# Patient Record
Sex: FEMALE | Race: WHITE | NOT HISPANIC OR LATINO | Employment: FULL TIME | URBAN - METROPOLITAN AREA
[De-identification: names, ages, dates, MRNs, and addresses within clinical notes are randomized per-mention and may not be internally consistent; named-entity substitution may affect disease eponyms.]

---

## 2020-01-01 ENCOUNTER — EXTERNAL RECORD (OUTPATIENT)
Dept: OTHER | Age: 25
End: 2020-01-01

## 2020-07-10 ENCOUNTER — EMPLOYEE HEALTH (OUTPATIENT)
Dept: OTHER | Age: 25
End: 2020-07-10

## 2020-07-10 DIAGNOSIS — Z11.59 SCREENING FOR VIRAL DISEASE: Primary | ICD-10-CM

## 2020-08-05 ENCOUNTER — EMPLOYEE HEALTH (OUTPATIENT)
Dept: OTHER | Age: 25
End: 2020-08-05

## 2020-08-06 ENCOUNTER — EMPLOYEE HEALTH (OUTPATIENT)
Dept: OTHER | Age: 25
End: 2020-08-06

## 2020-11-07 LAB
SARS-COV-2 RNA SPEC QL NAA+PROBE: POSITIVE
SPECIMEN SOURCE: ABNORMAL

## 2020-11-09 ENCOUNTER — EMPLOYEE HEALTH (OUTPATIENT)
Dept: OTHER | Age: 25
End: 2020-11-09

## 2020-11-10 ENCOUNTER — EMPLOYEE HEALTH (OUTPATIENT)
Dept: OTHER | Age: 25
End: 2020-11-10

## 2020-11-10 DIAGNOSIS — Z20.822 SUSPECTED COVID-19 VIRUS INFECTION: Primary | ICD-10-CM

## 2020-11-17 ENCOUNTER — EMPLOYEE HEALTH (OUTPATIENT)
Dept: OTHER | Age: 25
End: 2020-11-17

## 2020-11-19 ENCOUNTER — EMPLOYEE HEALTH (OUTPATIENT)
Dept: OTHER | Age: 25
End: 2020-11-19

## 2021-01-21 ENCOUNTER — IMMUNIZATION (OUTPATIENT)
Dept: LAB | Age: 26
End: 2021-01-21

## 2021-01-21 DIAGNOSIS — Z23 NEED FOR VACCINATION: Primary | ICD-10-CM

## 2021-01-21 PROCEDURE — 91300 COVID 19 PFIZER-BIONTECH: CPT

## 2021-01-21 PROCEDURE — 0001A COVID 19 PFIZER-BIONTECH: CPT

## 2021-02-15 ENCOUNTER — IMMUNIZATION (OUTPATIENT)
Dept: LAB | Age: 26
End: 2021-02-15
Attending: HOSPITALIST

## 2021-02-15 DIAGNOSIS — Z23 NEED FOR VACCINATION: Primary | ICD-10-CM

## 2021-02-15 PROCEDURE — 91300 COVID 19 PFIZER-BIONTECH: CPT

## 2021-02-15 PROCEDURE — 0002A COVID 19 PFIZER-BIONTECH: CPT

## 2021-04-05 LAB — CYTOLOGY CVX/VAG DOC THIN PREP: NORMAL

## 2021-07-29 ENCOUNTER — EMPLOYEE HEALTH (OUTPATIENT)
Dept: OTHER | Age: 26
End: 2021-07-29

## 2021-07-29 DIAGNOSIS — Z11.59 SCREENING FOR VIRAL DISEASE: Primary | ICD-10-CM

## 2021-08-07 ENCOUNTER — EMPLOYEE HEALTH (OUTPATIENT)
Dept: OTHER | Age: 26
End: 2021-08-07

## 2021-08-07 DIAGNOSIS — Z11.59 SCREENING FOR VIRAL DISEASE: Primary | ICD-10-CM

## 2021-08-07 LAB — SARS-COV-2 AG RESP QL IA.RAPID: NEGATIVE

## 2021-08-11 ENCOUNTER — EMPLOYEE HEALTH (OUTPATIENT)
Dept: OTHER | Age: 26
End: 2021-08-11

## 2021-09-13 ENCOUNTER — EMPLOYEE HEALTH (OUTPATIENT)
Dept: OTHER | Facility: HOSPITAL | Age: 26
End: 2021-09-13
Attending: PREVENTIVE MEDICINE

## 2021-09-13 DIAGNOSIS — Z11.1 SCREENING-PULMONARY TB: Primary | ICD-10-CM

## 2021-09-13 PROCEDURE — 86480 TB TEST CELL IMMUN MEASURE: CPT

## 2021-09-15 LAB
M TB IFN-G CD4+ T-CELLS BLD-ACNC: 0.03 IU/ML
M TB TUBERC IFN-G BLD QL: NEGATIVE
M TB TUBERC IGNF/MITOGEN IGNF CONTROL: >10 IU/ML
QFT TB1 AG MINUS NIL: -0.01 IU/ML
QFT TB2 AG MINUS NIL: 0.02 IU/ML

## 2021-12-23 ENCOUNTER — HOSPITAL ENCOUNTER (EMERGENCY)
Facility: HOSPITAL | Age: 26
Discharge: HOME | End: 2021-12-23

## 2021-12-28 ENCOUNTER — TELEPHONE (OUTPATIENT)
Dept: INTERNAL MEDICINE CLINIC | Facility: HOSPITAL | Age: 26
End: 2021-12-28

## 2022-01-07 ENCOUNTER — LAB ENCOUNTER (OUTPATIENT)
Dept: LAB | Facility: HOSPITAL | Age: 27
End: 2022-01-07
Attending: PREVENTIVE MEDICINE

## 2022-01-07 DIAGNOSIS — Z11.1 SCREENING FOR TUBERCULOSIS: ICD-10-CM

## 2022-01-07 PROCEDURE — 86480 TB TEST CELL IMMUN MEASURE: CPT

## 2022-01-07 PROCEDURE — 36415 COLL VENOUS BLD VENIPUNCTURE: CPT

## 2022-01-10 LAB
M TB IFN-G CD4+ T-CELLS BLD-ACNC: 0 IU/ML
M TB TUBERC IFN-G BLD QL: NEGATIVE
M TB TUBERC IGNF/MITOGEN IGNF CONTROL: >10 IU/ML
QFT TB1 AG MINUS NIL: 0 IU/ML
QFT TB2 AG MINUS NIL: 0 IU/ML

## 2022-04-13 ENCOUNTER — LAB ENCOUNTER (OUTPATIENT)
Dept: LAB | Age: 27
End: 2022-04-13
Attending: PREVENTIVE MEDICINE

## 2022-10-19 ENCOUNTER — LAB REQUISITION (OUTPATIENT)
Dept: LAB | Age: 27
End: 2022-10-19

## 2022-10-19 DIAGNOSIS — Z02.1 ENCOUNTER FOR PRE-EMPLOYMENT EXAMINATION: ICD-10-CM

## 2022-10-19 PROCEDURE — PSEU9049 QUANTIFERON TB PLUS: Performed by: CLINICAL MEDICAL LABORATORY

## 2022-10-19 PROCEDURE — 86480 TB TEST CELL IMMUN MEASURE: CPT | Performed by: CLINICAL MEDICAL LABORATORY

## 2022-10-20 LAB
GAMMA INTERFERON BACKGROUND BLD IA-ACNC: 0.05 IU/ML
M TB IFN-G BLD-IMP: NEGATIVE
M TB IFN-G CD4+ BCKGRND COR BLD-ACNC: 0 IU/ML
M TB IFN-G CD4+CD8+ BCKGRND COR BLD-ACNC: 0 IU/ML
MITOGEN IGNF BCKGRD COR BLD-ACNC: >10 IU/ML

## 2023-03-08 ENCOUNTER — HOSPITAL ENCOUNTER (EMERGENCY)
Facility: HOSPITAL | Age: 28
Discharge: HOME | End: 2023-03-08
Attending: EMERGENCY MEDICINE
Payer: COMMERCIAL

## 2023-03-08 VITALS
RESPIRATION RATE: 18 BRPM | TEMPERATURE: 97 F | WEIGHT: 150 LBS | DIASTOLIC BLOOD PRESSURE: 82 MMHG | SYSTOLIC BLOOD PRESSURE: 133 MMHG | HEART RATE: 76 BPM | HEIGHT: 65 IN | OXYGEN SATURATION: 100 % | BODY MASS INDEX: 24.99 KG/M2

## 2023-03-08 DIAGNOSIS — R31.9 URINARY TRACT INFECTION WITH HEMATURIA, SITE UNSPECIFIED: Primary | ICD-10-CM

## 2023-03-08 DIAGNOSIS — N39.0 URINARY TRACT INFECTION WITH HEMATURIA, SITE UNSPECIFIED: Primary | ICD-10-CM

## 2023-03-08 LAB
BILIRUB UR QL STRIP.AUTO: NEGATIVE
COLOR UR AUTO: YELLOW
GLUCOSE UR STRIP.AUTO-MCNC: NEGATIVE MG/DL
KETONES UR STRIP.AUTO-MCNC: NEGATIVE MG/DL
NITRITE UR QL STRIP.AUTO: NEGATIVE
PH UR STRIP.AUTO: 6 [PH] (ref 5–8)
PROT UR STRIP.AUTO-MCNC: 30 MG/DL
RBC #/AREA URNS AUTO: >10 /HPF
SP GR UR STRIP.AUTO: 1.01 (ref 1–1.03)
UROBILINOGEN UR STRIP.AUTO-MCNC: <2 MG/DL

## 2023-03-08 PROCEDURE — 81001 URINALYSIS AUTO W/SCOPE: CPT | Performed by: EMERGENCY MEDICINE

## 2023-03-08 PROCEDURE — 87086 URINE CULTURE/COLONY COUNT: CPT | Performed by: EMERGENCY MEDICINE

## 2023-03-08 PROCEDURE — 87186 SC STD MICRODIL/AGAR DIL: CPT | Performed by: EMERGENCY MEDICINE

## 2023-03-08 PROCEDURE — 87077 CULTURE AEROBIC IDENTIFY: CPT | Performed by: EMERGENCY MEDICINE

## 2023-03-08 PROCEDURE — 99283 EMERGENCY DEPT VISIT LOW MDM: CPT

## 2023-03-08 RX ORDER — OMEPRAZOLE 20 MG/1
20 CAPSULE, DELAYED RELEASE ORAL
COMMUNITY

## 2023-03-08 RX ORDER — FLUOXETINE 20 MG/1
20 TABLET, FILM COATED ORAL DAILY
COMMUNITY

## 2023-03-08 RX ORDER — SULFAMETHOXAZOLE AND TRIMETHOPRIM 800; 160 MG/1; MG/1
1 TABLET ORAL 2 TIMES DAILY
Qty: 6 TABLET | Refills: 0 | Status: SHIPPED | OUTPATIENT
Start: 2023-03-08 | End: 2023-03-11

## 2023-05-29 PROBLEM — K29.50 CHRONIC GASTRITIS: Status: ACTIVE | Noted: 2021-03-24

## 2023-05-29 PROBLEM — L70.0 ACNE VULGARIS: Status: ACTIVE | Noted: 2018-04-26

## 2023-05-29 PROBLEM — L30.9 ECZEMA: Status: ACTIVE | Noted: 2020-01-29

## 2023-05-30 ENCOUNTER — OFFICE VISIT (OUTPATIENT)
Dept: INTERNAL MEDICINE CLINIC | Facility: CLINIC | Age: 28
End: 2023-05-30
Payer: COMMERCIAL

## 2023-05-30 VITALS
SYSTOLIC BLOOD PRESSURE: 118 MMHG | BODY MASS INDEX: 24.66 KG/M2 | DIASTOLIC BLOOD PRESSURE: 70 MMHG | OXYGEN SATURATION: 98 % | HEART RATE: 78 BPM | RESPIRATION RATE: 16 BRPM | HEIGHT: 65 IN | WEIGHT: 148 LBS | TEMPERATURE: 98 F

## 2023-05-30 DIAGNOSIS — Z00.00 ANNUAL PHYSICAL EXAM: Primary | ICD-10-CM

## 2023-05-30 DIAGNOSIS — K29.50 CHRONIC GASTRITIS WITHOUT BLEEDING, UNSPECIFIED GASTRITIS TYPE: ICD-10-CM

## 2023-05-30 DIAGNOSIS — Z00.00 LABORATORY EXAM ORDERED AS PART OF ROUTINE GENERAL MEDICAL EXAMINATION: ICD-10-CM

## 2023-05-30 DIAGNOSIS — Z13.21 ENCOUNTER FOR VITAMIN DEFICIENCY SCREENING: ICD-10-CM

## 2023-05-30 DIAGNOSIS — Z11.3 SCREENING EXAMINATION FOR STD (SEXUALLY TRANSMITTED DISEASE): ICD-10-CM

## 2023-05-30 PROBLEM — F41.9 ANXIETY: Status: ACTIVE | Noted: 2023-05-30

## 2023-05-30 PROBLEM — F32.A DEPRESSION: Status: ACTIVE | Noted: 2023-05-30

## 2023-06-12 ENCOUNTER — LAB ENCOUNTER (OUTPATIENT)
Dept: LAB | Facility: HOSPITAL | Age: 28
End: 2023-06-12
Payer: COMMERCIAL

## 2023-06-12 DIAGNOSIS — Z13.21 ENCOUNTER FOR VITAMIN DEFICIENCY SCREENING: ICD-10-CM

## 2023-06-12 DIAGNOSIS — Z11.3 SCREENING EXAMINATION FOR STD (SEXUALLY TRANSMITTED DISEASE): ICD-10-CM

## 2023-06-12 DIAGNOSIS — Z00.00 LABORATORY EXAM ORDERED AS PART OF ROUTINE GENERAL MEDICAL EXAMINATION: ICD-10-CM

## 2023-06-12 LAB
ALBUMIN SERPL-MCNC: 3.8 G/DL (ref 3.4–5)
ALBUMIN/GLOB SERPL: 0.9 {RATIO} (ref 1–2)
ALP LIVER SERPL-CCNC: 23 U/L
ALT SERPL-CCNC: 41 U/L
ANION GAP SERPL CALC-SCNC: 7 MMOL/L (ref 0–18)
AST SERPL-CCNC: 31 U/L (ref 15–37)
BASOPHILS # BLD AUTO: 0.04 X10(3) UL (ref 0–0.2)
BASOPHILS NFR BLD AUTO: 0.6 %
BILIRUB SERPL-MCNC: 0.3 MG/DL (ref 0.1–2)
BILIRUB UR QL STRIP.AUTO: NEGATIVE
BUN BLD-MCNC: 10 MG/DL (ref 7–18)
CALCIUM BLD-MCNC: 9.1 MG/DL (ref 8.5–10.1)
CHLORIDE SERPL-SCNC: 106 MMOL/L (ref 98–112)
CHOLEST SERPL-MCNC: 159 MG/DL (ref ?–200)
CLARITY UR REFRACT.AUTO: CLEAR
CO2 SERPL-SCNC: 26 MMOL/L (ref 21–32)
COLOR UR AUTO: COLORLESS
CREAT BLD-MCNC: 0.8 MG/DL
EOSINOPHIL # BLD AUTO: 0.14 X10(3) UL (ref 0–0.7)
EOSINOPHIL NFR BLD AUTO: 2.1 %
ERYTHROCYTE [DISTWIDTH] IN BLOOD BY AUTOMATED COUNT: 13.5 %
EST. AVERAGE GLUCOSE BLD GHB EST-MCNC: 114 MG/DL (ref 68–126)
FASTING PATIENT LIPID ANSWER: YES
FASTING STATUS PATIENT QL REPORTED: YES
GFR SERPLBLD BASED ON 1.73 SQ M-ARVRAT: 104 ML/MIN/1.73M2 (ref 60–?)
GLOBULIN PLAS-MCNC: 4.4 G/DL (ref 2.8–4.4)
GLUCOSE BLD-MCNC: 84 MG/DL (ref 70–99)
GLUCOSE UR STRIP.AUTO-MCNC: NEGATIVE MG/DL
HBA1C MFR BLD: 5.6 % (ref ?–5.7)
HCT VFR BLD AUTO: 43.2 %
HDLC SERPL-MCNC: 89 MG/DL (ref 40–59)
HGB BLD-MCNC: 13.6 G/DL
IMM GRANULOCYTES # BLD AUTO: 0.02 X10(3) UL (ref 0–1)
IMM GRANULOCYTES NFR BLD: 0.3 %
KETONES UR STRIP.AUTO-MCNC: NEGATIVE MG/DL
LDLC SERPL CALC-MCNC: 59 MG/DL (ref ?–100)
LEUKOCYTE ESTERASE UR QL STRIP.AUTO: NEGATIVE
LYMPHOCYTES # BLD AUTO: 1.94 X10(3) UL (ref 1–4)
LYMPHOCYTES NFR BLD AUTO: 29.4 %
MCH RBC QN AUTO: 26.9 PG (ref 26–34)
MCHC RBC AUTO-ENTMCNC: 31.5 G/DL (ref 31–37)
MCV RBC AUTO: 85.4 FL
MONOCYTES # BLD AUTO: 0.45 X10(3) UL (ref 0.1–1)
MONOCYTES NFR BLD AUTO: 6.8 %
NEUTROPHILS # BLD AUTO: 4.01 X10 (3) UL (ref 1.5–7.7)
NEUTROPHILS # BLD AUTO: 4.01 X10(3) UL (ref 1.5–7.7)
NEUTROPHILS NFR BLD AUTO: 60.8 %
NITRITE UR QL STRIP.AUTO: NEGATIVE
NONHDLC SERPL-MCNC: 70 MG/DL (ref ?–130)
OSMOLALITY SERPL CALC.SUM OF ELEC: 286 MOSM/KG (ref 275–295)
PH UR STRIP.AUTO: 7 [PH] (ref 5–8)
PLATELET # BLD AUTO: 295 10(3)UL (ref 150–450)
POTASSIUM SERPL-SCNC: 3.7 MMOL/L (ref 3.5–5.1)
PROT SERPL-MCNC: 8.2 G/DL (ref 6.4–8.2)
PROT UR STRIP.AUTO-MCNC: NEGATIVE MG/DL
RBC # BLD AUTO: 5.06 X10(6)UL
RBC UR QL AUTO: NEGATIVE
SODIUM SERPL-SCNC: 139 MMOL/L (ref 136–145)
SP GR UR STRIP.AUTO: 1 (ref 1–1.03)
T PALLIDUM AB SER QL IA: NONREACTIVE
TRIGL SERPL-MCNC: 53 MG/DL (ref 30–149)
TSI SER-ACNC: 1.34 MIU/ML (ref 0.36–3.74)
UROBILINOGEN UR STRIP.AUTO-MCNC: <2 MG/DL
VIT D+METAB SERPL-MCNC: 50.3 NG/ML (ref 30–100)
VLDLC SERPL CALC-MCNC: 8 MG/DL (ref 0–30)
WBC # BLD AUTO: 6.6 X10(3) UL (ref 4–11)

## 2023-06-12 PROCEDURE — 80061 LIPID PANEL: CPT

## 2023-06-12 PROCEDURE — 36415 COLL VENOUS BLD VENIPUNCTURE: CPT

## 2023-06-12 PROCEDURE — 86780 TREPONEMA PALLIDUM: CPT

## 2023-06-12 PROCEDURE — 81003 URINALYSIS AUTO W/O SCOPE: CPT

## 2023-06-12 PROCEDURE — 87591 N.GONORRHOEAE DNA AMP PROB: CPT

## 2023-06-12 PROCEDURE — 87491 CHLMYD TRACH DNA AMP PROBE: CPT

## 2023-06-12 PROCEDURE — 87389 HIV-1 AG W/HIV-1&-2 AB AG IA: CPT

## 2023-06-12 PROCEDURE — 83036 HEMOGLOBIN GLYCOSYLATED A1C: CPT

## 2023-06-12 PROCEDURE — 84443 ASSAY THYROID STIM HORMONE: CPT

## 2023-06-12 PROCEDURE — 85025 COMPLETE CBC W/AUTO DIFF WBC: CPT

## 2023-06-12 PROCEDURE — 82306 VITAMIN D 25 HYDROXY: CPT

## 2023-06-12 PROCEDURE — 80053 COMPREHEN METABOLIC PANEL: CPT

## 2023-06-13 LAB
C TRACH DNA SPEC QL NAA+PROBE: NEGATIVE
N GONORRHOEA DNA SPEC QL NAA+PROBE: NEGATIVE

## 2023-06-22 ENCOUNTER — OFFICE VISIT (OUTPATIENT)
Dept: INTERNAL MEDICINE CLINIC | Facility: CLINIC | Age: 28
End: 2023-06-22
Payer: COMMERCIAL

## 2023-06-22 VITALS
HEIGHT: 65 IN | OXYGEN SATURATION: 99 % | HEART RATE: 80 BPM | DIASTOLIC BLOOD PRESSURE: 72 MMHG | BODY MASS INDEX: 25.16 KG/M2 | WEIGHT: 151 LBS | SYSTOLIC BLOOD PRESSURE: 120 MMHG | RESPIRATION RATE: 16 BRPM | TEMPERATURE: 98 F

## 2023-06-22 DIAGNOSIS — J32.9 BACTERIAL SINUSITIS: Primary | ICD-10-CM

## 2023-06-22 DIAGNOSIS — B96.89 BACTERIAL SINUSITIS: Primary | ICD-10-CM

## 2023-06-22 DIAGNOSIS — J02.9 PHARYNGITIS, UNSPECIFIED ETIOLOGY: ICD-10-CM

## 2023-06-22 DIAGNOSIS — J03.90 TONSILLITIS WITH EXUDATE: ICD-10-CM

## 2023-06-22 PROCEDURE — 3008F BODY MASS INDEX DOCD: CPT

## 2023-06-22 PROCEDURE — 99213 OFFICE O/P EST LOW 20 MIN: CPT

## 2023-06-22 PROCEDURE — 3074F SYST BP LT 130 MM HG: CPT

## 2023-06-22 PROCEDURE — 3078F DIAST BP <80 MM HG: CPT

## 2023-06-22 RX ORDER — PREDNISONE 20 MG/1
40 TABLET ORAL DAILY
Qty: 14 TABLET | Refills: 0 | Status: SHIPPED | OUTPATIENT
Start: 2023-06-22 | End: 2023-06-29

## 2023-06-22 RX ORDER — AMOXICILLIN AND CLAVULANATE POTASSIUM 875; 125 MG/1; MG/1
1 TABLET, FILM COATED ORAL 2 TIMES DAILY
Qty: 20 TABLET | Refills: 0 | Status: SHIPPED | OUTPATIENT
Start: 2023-06-22 | End: 2023-07-02

## 2023-07-10 ENCOUNTER — HOSPITAL ENCOUNTER (EMERGENCY)
Facility: HOSPITAL | Age: 28
Discharge: HOME | End: 2023-07-11
Attending: EMERGENCY MEDICINE
Payer: OTHER MISCELLANEOUS

## 2023-07-10 VITALS
SYSTOLIC BLOOD PRESSURE: 120 MMHG | BODY MASS INDEX: 24.99 KG/M2 | TEMPERATURE: 98 F | WEIGHT: 150 LBS | OXYGEN SATURATION: 100 % | HEART RATE: 85 BPM | RESPIRATION RATE: 18 BRPM | HEIGHT: 65 IN | DIASTOLIC BLOOD PRESSURE: 70 MMHG
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
## 2023-07-10 PROCEDURE — 36415 COLL VENOUS BLD VENIPUNCTURE: CPT

## 2023-07-10 PROCEDURE — 99283 EMERGENCY DEPT VISIT LOW MDM: CPT

## 2023-07-10 PROCEDURE — 99284 EMERGENCY DEPT VISIT MOD MDM: CPT

## 2023-07-11 LAB
HBV SURFACE AB SER QL: REACTIVE
HBV SURFACE AB SERPL IA-ACNC: 13.51 MIU/ML
HCV AB SERPL QL IA: NONREACTIVE

## 2023-07-11 PROCEDURE — 86706 HEP B SURFACE ANTIBODY: CPT | Performed by: EMERGENCY MEDICINE

## 2023-07-11 PROCEDURE — 86803 HEPATITIS C AB TEST: CPT | Performed by: EMERGENCY MEDICINE

## 2023-07-11 PROCEDURE — 87389 HIV-1 AG W/HIV-1&-2 AB AG IA: CPT | Performed by: EMERGENCY MEDICINE

## 2023-08-01 ENCOUNTER — APPOINTMENT (OUTPATIENT)
Dept: OTHER | Facility: HOSPITAL | Age: 28
End: 2023-08-01
Attending: PREVENTIVE MEDICINE

## 2023-08-03 ENCOUNTER — APPOINTMENT (OUTPATIENT)
Dept: OTHER | Facility: HOSPITAL | Age: 28
End: 2023-08-03
Attending: PREVENTIVE MEDICINE

## 2023-08-04 RX ORDER — FLUOXETINE 20 MG/1
20 TABLET, FILM COATED ORAL DAILY
Qty: 90 TABLET | Refills: 0 | Status: SHIPPED | OUTPATIENT
Start: 2023-08-04

## 2023-08-04 NOTE — TELEPHONE ENCOUNTER
Last time medication was refilled 04/25/2023   Quantity and # of refills 90 w/ 0  Last OV 06/22/2023  Next OV   Future Appointments   Date Time Provider Romana Garces   8/31/2023 10:00 AM Phyllis Burroughs DO [facility]   5/31/2024  8:00 AM LILLIAN Perdomo [facility] [address]     Sent to Dr. Tiffany Avila for approval.

## 2023-08-18 ENCOUNTER — WALK IN (OUTPATIENT)
Dept: URGENT CARE | Age: 28
End: 2023-08-18
Attending: FAMILY MEDICINE

## 2023-08-18 VITALS
DIASTOLIC BLOOD PRESSURE: 63 MMHG | HEART RATE: 65 BPM | TEMPERATURE: 98.9 F | OXYGEN SATURATION: 98 % | SYSTOLIC BLOOD PRESSURE: 94 MMHG | RESPIRATION RATE: 16 BRPM

## 2023-08-18 DIAGNOSIS — R30.0 DYSURIA: Primary | ICD-10-CM

## 2023-08-18 DIAGNOSIS — N39.0 ACUTE UTI: ICD-10-CM

## 2023-08-18 LAB
APPEARANCE, POC: CLEAR
B-HCG UR QL: NEGATIVE
BILIRUBIN, POC: NEGATIVE
COLOR, POC: YELLOW
GLUCOSE UR-MCNC: NEGATIVE MG/DL
INTERNAL PROCEDURAL CONTROLS ACCEPTABLE: YES
KETONES, POC: NEGATIVE MG/DL
NITRITE, POC: NEGATIVE
OCCULT BLOOD, POC: ABNORMAL
PH UR: 6 [PH] (ref 5–7)
PROT UR-MCNC: NEGATIVE MG/DL
SP GR UR: <= 1.005 (ref 1–1.03)
TEST LOT EXPIRATION DATE: NORMAL
TEST LOT NUMBER: NORMAL
UROBILINOGEN UR-MCNC: 0.2 MG/DL (ref 0–1)
WBC (LEUKOCYTE) ESTERASE, POC: ABNORMAL

## 2023-08-18 PROCEDURE — 81025 URINE PREGNANCY TEST: CPT | Performed by: NURSE PRACTITIONER

## 2023-08-18 PROCEDURE — 87086 URINE CULTURE/COLONY COUNT: CPT | Performed by: NURSE PRACTITIONER

## 2023-08-18 PROCEDURE — 81003 URINALYSIS AUTO W/O SCOPE: CPT | Performed by: NURSE PRACTITIONER

## 2023-08-18 PROCEDURE — 99202 OFFICE O/P NEW SF 15 MIN: CPT

## 2023-08-18 RX ORDER — PHENAZOPYRIDINE HYDROCHLORIDE 100 MG/1
100 TABLET, FILM COATED ORAL 3 TIMES DAILY PRN
Qty: 6 TABLET | Refills: 0 | Status: SHIPPED | OUTPATIENT
Start: 2023-08-18

## 2023-08-18 RX ORDER — FLUOXETINE 20 MG/1
1 TABLET, FILM COATED ORAL DAILY
COMMUNITY
Start: 2023-08-04

## 2023-08-18 RX ORDER — MEDROXYPROGESTERONE ACETATE 400 MG/ML
INJECTION, SUSPENSION INTRAMUSCULAR
COMMUNITY

## 2023-08-18 RX ORDER — CEPHALEXIN 500 MG/1
500 CAPSULE ORAL 2 TIMES DAILY
Qty: 14 CAPSULE | Refills: 0 | Status: SHIPPED | OUTPATIENT
Start: 2023-08-18 | End: 2023-08-25

## 2023-08-18 RX ORDER — OMEPRAZOLE 20 MG/1
20 CAPSULE, DELAYED RELEASE ORAL
COMMUNITY

## 2023-08-18 ASSESSMENT — ENCOUNTER SYMPTOMS
WEAKNESS: 0
SORE THROAT: 0
COUGH: 0
SHORTNESS OF BREATH: 0
CHILLS: 0
DEPRESSION: 0
FEVER: 0
WEIGHT LOSS: 0
DIARRHEA: 0
DIZZINESS: 0
ABDOMINAL PAIN: 0
DIAPHORESIS: 0
NAUSEA: 0
VOMITING: 0
EYE PAIN: 0
HEADACHES: 0
NERVOUS/ANXIOUS: 0

## 2023-08-18 ASSESSMENT — PAIN SCALES - GENERAL: PAINLEVEL: 2

## 2023-08-20 LAB — BACTERIA UR CULT: ABNORMAL

## 2023-09-20 RX ORDER — FLUOXETINE 20 MG/1
20 TABLET, FILM COATED ORAL DAILY
Qty: 90 TABLET | Refills: 0 | OUTPATIENT
Start: 2023-09-20

## 2023-09-20 NOTE — TELEPHONE ENCOUNTER
Last time medication was refilled 08/04/2023  Quantity and # of refills 90 w/ 0   Last OV 06/22/2023  Next OV   Future Appointments   Date Time Provider Romana Garces   5/31/2024  8:00 AM LILLIAN Perez [facility] [address]     Refill too soon. Medication declined.

## 2023-09-26 ENCOUNTER — OFFICE VISIT (OUTPATIENT)
Dept: INTERNAL MEDICINE CLINIC | Facility: CLINIC | Age: 28
End: 2023-09-26
Payer: COMMERCIAL

## 2023-09-26 ENCOUNTER — LAB ENCOUNTER (OUTPATIENT)
Dept: LAB | Age: 28
End: 2023-09-26
Payer: COMMERCIAL

## 2023-09-26 VITALS
HEART RATE: 78 BPM | DIASTOLIC BLOOD PRESSURE: 78 MMHG | TEMPERATURE: 97 F | RESPIRATION RATE: 16 BRPM | BODY MASS INDEX: 25.49 KG/M2 | SYSTOLIC BLOOD PRESSURE: 120 MMHG | HEIGHT: 65 IN | WEIGHT: 153 LBS | OXYGEN SATURATION: 99 %

## 2023-09-26 DIAGNOSIS — J03.91 RECURRENT TONSILLITIS: ICD-10-CM

## 2023-09-26 DIAGNOSIS — R59.1 LYMPHADENOPATHY: ICD-10-CM

## 2023-09-26 DIAGNOSIS — J02.9 SORE THROAT: ICD-10-CM

## 2023-09-26 DIAGNOSIS — J03.90 TONSILLITIS WITH EXUDATE: Primary | ICD-10-CM

## 2023-09-26 LAB
CONTROL LINE PRESENT WITH A CLEAR BACKGROUND (YES/NO): YES YES/NO
HETEROPH AB SER QL: NEGATIVE
KIT LOT #: NORMAL NUMERIC
STREP GRP A CUL-SCR: NEGATIVE

## 2023-09-26 PROCEDURE — 99213 OFFICE O/P EST LOW 20 MIN: CPT

## 2023-09-26 PROCEDURE — 86664 EPSTEIN-BARR NUCLEAR ANTIGEN: CPT

## 2023-09-26 PROCEDURE — 36415 COLL VENOUS BLD VENIPUNCTURE: CPT

## 2023-09-26 PROCEDURE — 86403 PARTICLE AGGLUT ANTBDY SCRN: CPT

## 2023-09-26 PROCEDURE — 86665 EPSTEIN-BARR CAPSID VCA: CPT

## 2023-09-26 PROCEDURE — 87880 STREP A ASSAY W/OPTIC: CPT

## 2023-09-26 RX ORDER — AMOXICILLIN AND CLAVULANATE POTASSIUM 875; 125 MG/1; MG/1
1 TABLET, FILM COATED ORAL 2 TIMES DAILY
Qty: 20 TABLET | Refills: 0 | Status: SHIPPED | OUTPATIENT
Start: 2023-09-26 | End: 2023-10-06

## 2023-09-26 RX ORDER — PREDNISONE 20 MG/1
40 TABLET ORAL DAILY
Qty: 14 TABLET | Refills: 0 | Status: SHIPPED | OUTPATIENT
Start: 2023-09-26 | End: 2023-10-03

## 2023-09-27 LAB
EBV NA IGG SER QL IA: NEGATIVE
EBV VCA IGG SER QL IA: POSITIVE
EBV VCA IGM SER QL IA: NEGATIVE

## 2023-09-28 ENCOUNTER — OFFICE VISIT (OUTPATIENT)
Facility: LOCATION | Age: 28
End: 2023-09-28
Payer: COMMERCIAL

## 2023-09-28 DIAGNOSIS — J35.01 CHRONIC TONSILLITIS: Primary | ICD-10-CM

## 2023-09-28 PROCEDURE — 99203 OFFICE O/P NEW LOW 30 MIN: CPT | Performed by: OTOLARYNGOLOGY

## 2023-09-28 RX ORDER — AMOXICILLIN AND CLAVULANATE POTASSIUM 875; 125 MG/1; MG/1
1 TABLET, FILM COATED ORAL EVERY 12 HOURS
Qty: 20 TABLET | Refills: 0 | Status: SHIPPED | OUTPATIENT
Start: 2023-09-28

## 2023-09-28 NOTE — PROGRESS NOTES
Okey Hashimoto is a 34year old female. 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 She has had bad strep 3 times this year. She is currently on antibiotics and prednisone. She denies associated snoring or sleep apnea. Current Outpatient Medications   Medication Sig Dispense Refill    amoxicillin clavulanate 875-125 MG Oral Tab Take 1 tablet by mouth every 12 (twelve) hours. 20 tablet 0    predniSONE 20 MG Oral Tab Take 2 tablets (40 mg total) by mouth daily for 7 days. 14 tablet 0    amoxicillin clavulanate 875-125 MG Oral Tab Take 1 tablet by mouth 2 (two) times daily for 10 days. 20 tablet 0    famotidine 20 MG Oral Tab Take 1 tablet (20 mg total) by mouth daily. 30 tablet 5    FLUoxetine HCl 20 MG Oral Tab Take 1 tablet (20 mg total) by mouth daily. 90 tablet 0      Past Medical History:   Diagnosis Date    Anxiety     Decorative tattoo     GERD (gastroesophageal reflux disease)     Nausea       Social History:  Social History     Socioeconomic History    Marital status: Single   Tobacco Use    Smoking status: Some Days     Types: Cigarettes    Smokeless tobacco: Never   Vaping Use    Vaping Use: Never used   Substance and Sexual Activity    Alcohol use: Yes     Comment: social    Drug use: Never      Past Surgical History:   Procedure Laterality Date    APPENDECTOMY      APPENDECTOMY      EGD      HAND SURGERY Right 2013         REVIEW OF SYSTEMS:   GENERAL HEALTH: feels well otherwise  GENERAL : denies fever, chills, sweats, weight loss, weight gain  SKIN: denies any unusual skin lesions or rashes  RESPIRATORY: denies shortness of breath with exertion  NEURO: denies headaches    EXAM:   LMP 09/16/2023 (Exact Date)     System Findings Details   Constitutional  Overall appearance - Normal.   Psychiatric  Orientation - Oriented to time, place, person & situation. Appropriate mood and affect.    Head/Face  Facial features -- Normal. Skull - Normal.   Eyes  Pupils equal ,round ,react to light and accomidate   Ears, Nose, Throat, Neck  Ears clear nose clear oropharynx +3/4 tonsils neck no masses   Neurological  Memory - Normal. Cranial nerves - Cranial nerves II through XII grossly intact. Lymph Detail  Submental. Submandibular. Anterior cervical. Posterior cervical. Supraclavicular. ASSESSMENT AND PLAN:   1. Chronic tonsillitis  She will take 20 days of antibiotic. She is currently on Augmentin. If symptoms persist despite a long course of antibiotic she would be a candidate for tonsillectomy. She will call me back. The patient indicates understanding of these issues and agrees to the plan. No follow-ups on file.     Karan Bojorquez MD  9/28/2023  5:23 PM

## 2023-11-29 RX ORDER — FLUOXETINE HYDROCHLORIDE 20 MG/1
20 CAPSULE ORAL DAILY
Qty: 90 CAPSULE | Refills: 0 | Status: SHIPPED | OUTPATIENT
Start: 2023-11-29

## 2023-11-29 NOTE — TELEPHONE ENCOUNTER
Last time medication was refilled 08/04/2023  Quantity and # of refills 90 w 0  Last OV 09/26/2023  Next OV 05/31/2024    Sent to LILLIAN Weiss for approval.

## 2023-12-06 ENCOUNTER — OFFICE VISIT (OUTPATIENT)
Dept: INTERNAL MEDICINE CLINIC | Facility: CLINIC | Age: 28
End: 2023-12-06
Payer: COMMERCIAL

## 2023-12-06 VITALS
WEIGHT: 159 LBS | RESPIRATION RATE: 16 BRPM | HEIGHT: 65 IN | TEMPERATURE: 98 F | BODY MASS INDEX: 26.49 KG/M2 | OXYGEN SATURATION: 99 % | DIASTOLIC BLOOD PRESSURE: 70 MMHG | HEART RATE: 83 BPM | SYSTOLIC BLOOD PRESSURE: 114 MMHG

## 2023-12-06 DIAGNOSIS — B00.89 RECURRENT HERPETIC WHITLOW: Primary | ICD-10-CM

## 2023-12-06 DIAGNOSIS — Z12.4 CERVICAL CANCER SCREENING: ICD-10-CM

## 2023-12-06 PROCEDURE — 3078F DIAST BP <80 MM HG: CPT

## 2023-12-06 PROCEDURE — 3074F SYST BP LT 130 MM HG: CPT

## 2023-12-06 PROCEDURE — 3008F BODY MASS INDEX DOCD: CPT

## 2023-12-06 PROCEDURE — 99213 OFFICE O/P EST LOW 20 MIN: CPT

## 2023-12-06 RX ORDER — FAMOTIDINE 20 MG/1
20 TABLET, FILM COATED ORAL DAILY
COMMUNITY

## 2023-12-18 ENCOUNTER — LAB ENCOUNTER (OUTPATIENT)
Dept: LAB | Facility: HOSPITAL | Age: 28
End: 2023-12-18
Payer: COMMERCIAL

## 2023-12-18 DIAGNOSIS — N91.2 AMENORRHEA: ICD-10-CM

## 2023-12-18 LAB — B-HCG SERPL-ACNC: <1 MIU/ML

## 2023-12-18 PROCEDURE — 84702 CHORIONIC GONADOTROPIN TEST: CPT

## 2023-12-18 PROCEDURE — 36415 COLL VENOUS BLD VENIPUNCTURE: CPT

## 2023-12-27 ENCOUNTER — OFFICE VISIT (OUTPATIENT)
Facility: LOCATION | Age: 28
End: 2023-12-27
Payer: COMMERCIAL

## 2023-12-27 DIAGNOSIS — J01.90 ACUTE NON-RECURRENT SINUSITIS, UNSPECIFIED LOCATION: ICD-10-CM

## 2023-12-27 DIAGNOSIS — J35.01 CHRONIC TONSILLITIS: Primary | ICD-10-CM

## 2023-12-27 PROCEDURE — 99214 OFFICE O/P EST MOD 30 MIN: CPT | Performed by: OTOLARYNGOLOGY

## 2023-12-27 RX ORDER — CEFUROXIME AXETIL 250 MG/1
250 TABLET ORAL 2 TIMES DAILY
Qty: 20 TABLET | Refills: 0 | Status: SHIPPED | OUTPATIENT
Start: 2023-12-27 | End: 2023-12-28 | Stop reason: ALTCHOICE

## 2023-12-27 NOTE — PROGRESS NOTES
William Barney is a 34year old female. Chief Complaint   Patient presents with    Tonsil Problem     HPI:   She works as a nurse in [employer]. She has had numerous episodes of tonsillitis. She was just on antibiotic once again at the end of November. She has had a cold over the last week. REVIEW OF SYSTEMS:   GENERAL HEALTH: feels well otherwise  GENERAL : denies fever, chills, sweats, weight loss, weight gain  SKIN: denies any unusual skin lesions or rashes  RESPIRATORY: denies shortness of breath with exertion  NEURO: denies headaches    EXAM:   [facility] 11/10/2023 (Exact Date)     System Findings Details   Constitutional  Overall appearance - Normal.   Psychiatric  Orientation - Oriented to time, place, person & situation. Appropriate mood and affect. Head/Face  Facial features -- Normal. Skull - Normal.   Eyes  Pupils equal ,round ,react to light and accomidate   Ears, Nose, Throat, Neck  Ears clear nose congestion oropharynx +2/4 tonsils with cobblestoning neck no masses   Neurological  Memory - Normal. Cranial nerves - Cranial nerves II through XII grossly intact. Lymph Detail  Submental. Submandibular. Anterior cervical. Posterior cervical. Supraclavicular. ASSESSMENT AND PLAN:   1. Chronic tonsillitis  She has had chronic problems on numerous rounds of antibiotics. Will plan for tonsillectomy. The risk benefits and alternatives were explained to the patient and/or parents. The risks are to include not limited to bleeding infection recurrent bleeding which could be serious velopharyngeal insufficiency and nonresolution of symptoms. 2. Acute non-recurrent sinusitis, unspecified location  She will use saline and Mucinex D. If symptoms worsen she will start on Ceftin. The patient indicates understanding of these issues and agrees to the plan. No follow-ups on file.     Yousif Hernandes MD  12/27/2023  10:23 AM

## 2023-12-28 ENCOUNTER — TELEPHONE (OUTPATIENT)
Facility: LOCATION | Age: 28
End: 2023-12-28

## 2023-12-28 DIAGNOSIS — J35.01 CHRONIC TONSILLITIS: Primary | ICD-10-CM

## 2024-01-15 NOTE — H&P
[facility]  History & Physical    Melissa Rosa Patient Status:  Hospital Outpatient Surgery    4/10/1995 MRN [id number]   Location [facility] SURGERY Attending Pradeep Elizalde MD   Hosp Day # 0 PCP Chase Kim MD     History of Present Illness:  Melissa Rosa is a(n) 28 year old female.  She has chronic tonsillitis.    History:  Past Medical History:   Diagnosis Date    Anxiety     Decorative tattoo     GERD (gastroesophageal reflux disease)     Hx of motion sickness     Nausea     PONV (postoperative nausea and vomiting)     SEVERE PONV    Visual impairment     CONTACTS/GLASSES     Past Surgical History:   Procedure Laterality Date    APPENDECTOMY      APPENDECTOMY      EGD      HAND SURGERY Right 2013     Family History   Problem Relation Age of Onset    Hypertension Father       reports that she has quit smoking. Her smoking use included cigarettes. She has never used smokeless tobacco. She reports current alcohol use. She reports that she does not use drugs.    Allergies:  No Known Allergies    Home Medications:  No medications prior to admission.       Physical Exam:   General: Alert, orientated x3.  Cooperative.  No apparent distress.  Vital Signs:  Ht 5' 5\" (1.651 m)   Wt 155 lb (70.3 kg)   LMP 2023 (Exact Date)   BMI 25.79 kg/m²   HEENT +3/4 tonsils  Neck: No tenderness to palpitation.  Full range of motion to flexion and extension, lateral rotation and lateral flexion of cervical spine.  No JVD. Supple.   Lungs: Clear to auscultation bilaterally.  Cardiac: Regular rate and rhythm. No murmur.  Abdomen:  Soft, non-distended, non-tender, with no rebound or guarding.  No peritoneal signs. No ascites.  Liver is within normal limits.  Spleen is not palpable.    Extremities:  No lower extremity edema noted.  Without clubbing or cyanosis.    Skin: Normal texture and turgor.  Lymphatic:  No palpable cervical lymphadenopathy.  Neurologic: Cranial nerves are grossly intact.  Motor  strength and sensory examination is grossly normal.  No focal neurologic deficit.    Laboratory Data:      Impression and Plan:  Patient Active Problem List   Diagnosis    Acne vulgaris    Allergic rhinitis, seasonal    Chronic gastritis    Eczema    Anxiety    Depression     Chronic tonsillitis    Tonsillectomy        Pradeep Elizalde MD  1/15/2024  2:15 PM

## 2024-01-23 ENCOUNTER — ANESTHESIA EVENT (OUTPATIENT)
Dept: SURGERY | Facility: HOSPITAL | Age: 29
End: 2024-01-23
Payer: COMMERCIAL

## 2024-01-23 ENCOUNTER — HOSPITAL ENCOUNTER (OUTPATIENT)
Facility: HOSPITAL | Age: 29
Setting detail: HOSPITAL OUTPATIENT SURGERY
Discharge: HOME | End: 2024-01-23
Attending: OTOLARYNGOLOGY | Admitting: OTOLARYNGOLOGY
Payer: COMMERCIAL

## 2024-01-23 ENCOUNTER — ANESTHESIA (OUTPATIENT)
Dept: SURGERY | Facility: HOSPITAL | Age: 29
End: 2024-01-23
Payer: COMMERCIAL

## 2024-01-23 VITALS
TEMPERATURE: 98 F | OXYGEN SATURATION: 100 % | DIASTOLIC BLOOD PRESSURE: 64 MMHG | SYSTOLIC BLOOD PRESSURE: 106 MMHG | BODY MASS INDEX: 25.83 KG/M2 | HEIGHT: 65 IN | HEART RATE: 82 BPM | WEIGHT: 155 LBS | RESPIRATION RATE: 16 BRPM

## 2024-01-23 DIAGNOSIS — J35.01 CHRONIC TONSILLITIS: ICD-10-CM

## 2024-01-23 LAB — B-HCG UR QL: NEGATIVE

## 2024-01-23 PROCEDURE — 0CTPXZZ RESECTION OF TONSILS, EXTERNAL APPROACH: ICD-10-PCS | Performed by: OTOLARYNGOLOGY

## 2024-01-23 PROCEDURE — 42826 REMOVAL OF TONSILS: CPT | Performed by: OTOLARYNGOLOGY

## 2024-01-23 RX ORDER — SODIUM CHLORIDE, SODIUM LACTATE, POTASSIUM CHLORIDE, CALCIUM CHLORIDE 600; 310; 30; 20 MG/100ML; MG/100ML; MG/100ML; MG/100ML
INJECTION, SOLUTION INTRAVENOUS CONTINUOUS
Status: DISCONTINUED | OUTPATIENT
Start: 2024-01-23 | End: 2024-01-23

## 2024-01-23 RX ORDER — DEXAMETHASONE SODIUM PHOSPHATE 4 MG/ML
VIAL (ML) INJECTION AS NEEDED
Status: DISCONTINUED | OUTPATIENT
Start: 2024-01-23 | End: 2024-01-23 | Stop reason: SURG

## 2024-01-23 RX ORDER — BUPIVACAINE HYDROCHLORIDE AND EPINEPHRINE 2.5; 5 MG/ML; UG/ML
INJECTION, SOLUTION EPIDURAL; INFILTRATION; INTRACAUDAL; PERINEURAL AS NEEDED
Status: DISCONTINUED | OUTPATIENT
Start: 2024-01-23 | End: 2024-01-23 | Stop reason: HOSPADM

## 2024-01-23 RX ORDER — LIDOCAINE HYDROCHLORIDE 10 MG/ML
INJECTION, SOLUTION EPIDURAL; INFILTRATION; INTRACAUDAL; PERINEURAL AS NEEDED
Status: DISCONTINUED | OUTPATIENT
Start: 2024-01-23 | End: 2024-01-23 | Stop reason: SURG

## 2024-01-23 RX ORDER — HYDROMORPHONE HYDROCHLORIDE 1 MG/ML
0.4 INJECTION, SOLUTION INTRAMUSCULAR; INTRAVENOUS; SUBCUTANEOUS EVERY 5 MIN PRN
Status: DISCONTINUED | OUTPATIENT
Start: 2024-01-23 | End: 2024-01-23

## 2024-01-23 RX ORDER — HYDROMORPHONE HYDROCHLORIDE 1 MG/ML
0.6 INJECTION, SOLUTION INTRAMUSCULAR; INTRAVENOUS; SUBCUTANEOUS EVERY 5 MIN PRN
Status: DISCONTINUED | OUTPATIENT
Start: 2024-01-23 | End: 2024-01-23

## 2024-01-23 RX ORDER — PROCHLORPERAZINE EDISYLATE 5 MG/ML
5 INJECTION INTRAMUSCULAR; INTRAVENOUS EVERY 8 HOURS PRN
Status: DISCONTINUED | OUTPATIENT
Start: 2024-01-23 | End: 2024-01-23

## 2024-01-23 RX ORDER — MIDAZOLAM HYDROCHLORIDE 1 MG/ML
INJECTION INTRAMUSCULAR; INTRAVENOUS AS NEEDED
Status: DISCONTINUED | OUTPATIENT
Start: 2024-01-23 | End: 2024-01-23 | Stop reason: SURG

## 2024-01-23 RX ORDER — ACETAMINOPHEN 500 MG
1000 TABLET ORAL ONCE AS NEEDED
Status: DISCONTINUED | OUTPATIENT
Start: 2024-01-23 | End: 2024-01-23

## 2024-01-23 RX ORDER — METOCLOPRAMIDE HYDROCHLORIDE 5 MG/ML
INJECTION INTRAMUSCULAR; INTRAVENOUS AS NEEDED
Status: DISCONTINUED | OUTPATIENT
Start: 2024-01-23 | End: 2024-01-23 | Stop reason: SURG

## 2024-01-23 RX ORDER — HYDROCODONE BITARTRATE AND ACETAMINOPHEN 5; 325 MG/1; MG/1
1 TABLET ORAL ONCE AS NEEDED
Status: DISCONTINUED | OUTPATIENT
Start: 2024-01-23 | End: 2024-01-23

## 2024-01-23 RX ORDER — NALOXONE HYDROCHLORIDE 0.4 MG/ML
0.08 INJECTION, SOLUTION INTRAMUSCULAR; INTRAVENOUS; SUBCUTANEOUS AS NEEDED
Status: DISCONTINUED | OUTPATIENT
Start: 2024-01-23 | End: 2024-01-23

## 2024-01-23 RX ORDER — ONDANSETRON 4 MG/1
4 TABLET, ORALLY DISINTEGRATING ORAL EVERY 4 HOURS PRN
Qty: 10 TABLET | Refills: 1 | Status: SHIPPED | OUTPATIENT
Start: 2024-01-23 | End: 2024-01-30

## 2024-01-23 RX ORDER — SCOLOPAMINE TRANSDERMAL SYSTEM 1 MG/1
1 PATCH, EXTENDED RELEASE TRANSDERMAL ONCE
Status: DISCONTINUED | OUTPATIENT
Start: 2024-01-23 | End: 2024-01-23

## 2024-01-23 RX ORDER — HYDROCODONE BITARTRATE AND ACETAMINOPHEN 5; 325 MG/1; MG/1
2 TABLET ORAL ONCE AS NEEDED
Status: DISCONTINUED | OUTPATIENT
Start: 2024-01-23 | End: 2024-01-23

## 2024-01-23 RX ORDER — ONDANSETRON 2 MG/ML
4 INJECTION INTRAMUSCULAR; INTRAVENOUS EVERY 6 HOURS PRN
Status: DISCONTINUED | OUTPATIENT
Start: 2024-01-23 | End: 2024-01-23

## 2024-01-23 RX ORDER — ONDANSETRON 2 MG/ML
INJECTION INTRAMUSCULAR; INTRAVENOUS AS NEEDED
Status: DISCONTINUED | OUTPATIENT
Start: 2024-01-23 | End: 2024-01-23 | Stop reason: SURG

## 2024-01-23 RX ORDER — HYDROMORPHONE HYDROCHLORIDE 1 MG/ML
0.2 INJECTION, SOLUTION INTRAMUSCULAR; INTRAVENOUS; SUBCUTANEOUS EVERY 5 MIN PRN
Status: DISCONTINUED | OUTPATIENT
Start: 2024-01-23 | End: 2024-01-23

## 2024-01-23 RX ORDER — ACETAMINOPHEN 500 MG
1000 TABLET ORAL ONCE
Status: DISCONTINUED | OUTPATIENT
Start: 2024-01-23 | End: 2024-01-23 | Stop reason: HOSPADM

## 2024-01-23 RX ADMIN — LIDOCAINE HYDROCHLORIDE 50 MG: 10 INJECTION, SOLUTION EPIDURAL; INFILTRATION; INTRACAUDAL; PERINEURAL at 07:32:00

## 2024-01-23 RX ADMIN — DEXAMETHASONE SODIUM PHOSPHATE 10 MG: 4 MG/ML VIAL (ML) INJECTION at 07:45:00

## 2024-01-23 RX ADMIN — METOCLOPRAMIDE HYDROCHLORIDE 10 MG: 5 INJECTION INTRAMUSCULAR; INTRAVENOUS at 07:47:00

## 2024-01-23 RX ADMIN — MIDAZOLAM HYDROCHLORIDE 2 MG: 1 INJECTION INTRAMUSCULAR; INTRAVENOUS at 07:31:00

## 2024-01-23 RX ADMIN — ONDANSETRON 4 MG: 2 INJECTION INTRAMUSCULAR; INTRAVENOUS at 07:47:00

## 2024-01-23 NOTE — DISCHARGE INSTRUCTIONS
[facility]  Rommel Giang MD, FACS                                        [address]  Pradeep Elizalde MD                                                  [place]  [address]  Ghassan Vegas MD                                                          [phone]      Tonsillectomy Instructions  Call the office within several days of surgery to arrange a follow-up appointment   HEMORRHAGE (BLEEDING):  A small percentage of patients will bleed at home following a tonsillectomy. In most cases, this will not be severe and will consist of spitting up a clot of blood followed by minimal bleeding for a period of 5-10 minutes.  Please call our office immediately if bleeding lasts more than 10-15 minutes, or is profuse.  AVOID ASPIRIN OR ANY NON-STEROIDAL ANTI-INFLAMMATORY MEDICATION (examples include: ibuprofen, Motrin, Advil, Aleve, naproxen) OR HERBAL SUPPLEMENTS (especially vitamin C, fish oil & gingko,) FOR 2 WEEKS AFTER SURGERY.   No vigorous physical activity for 14 days- this can cause bleeding.  Do not gargle (rinsing the mouth and brushing teeth are OK).  Avoid coughing or vigorous throat clearing.    WHAT TO EXPECT:  Throat Pain  Throat pain may last up to 2 weeks following surgery, and it is not unusual for the pain to worsen around days 4-6 due to normal changes in the throat during the healing process.  Ear Pain  Nerves that sense throat pain are shared by those that provide sensation from the ears, so patients may sometimes feel as if the ears hurt.  Bad Breath  Bad breath is normal during the healing process.  Tooth brushing and gum chewing are permissible, but avoid gargles or mouth washes.  Low-grade fevers  The inflammatory process from the throat can sometimes produce low-grade fevers (up to 101 degrees farenheit).  If fevers over 101 are experienced, please call our office.    MEDICATIONS:  You will receive a prescription for pain medication.  INSTEAD OF the  prescription medication, patients may try over-the-counter Tylenol (acetaminophen).  Children should be dosed according to weight, as indicated on the product packaging.     DIET:  Soft foods and cool drinks are best.  DO NOT drink through a straw.  Avoid acidic, spicy, crunchy, or sharp foods (especially “the 5 P’s”: peanuts, popcorn, potato chips, pretzels and pizza)

## 2024-01-23 NOTE — OPERATIVE REPORT
[facility]  Operative Note    Melissa Rosa Location: OR   [facility] [id number] MRN [id number]   Admission Date 1/23/2024 Operation Date 1/23/2024   Attending Physician Pradeep Elizalde MD Operating Physician Pradeep Elizalde MD       OPERATIVE REPORT   PREOPERATIVE DIAGNOSIS: Chronic tonsillitis and hypertrophied tonsils.   POSTOPERATIVE DIAGNOSIS: Chronic tonsillitis and hypertrophied tonsils.   PROCEDURE PERFORMED: Tonsillectomy.   ANESTHESIA: General endotracheal.   DESCRIPTION OF PROCEDURE: After satisfactory general endotracheal anesthesia induction, the table was turned, and the patient prepared for the procedure. The Lenore-Jose Manuel mouth gag was placed. The soft and hard palate were palpated, inspected, and noted to be normal. A red rubber catheter was placed through the nose to retract the soft palate. Mirror inspection revealed no significant adenoid tissue.   Attention was turned to tonsillectomy. The left tonsil was grabbed with a curved Allis clamp and pulled medially. The cautery and Coblator was used to make an incision in the mucosa, then used to remove the tonsil, taking care to stay within the tonsillar capsule. The same procedure was performed on the right hand side. The mouth gag was let down.  After 5 minutes, the mouth gags were expanded. Any area suspicious for future bleeding were cauterized with the Coblator. At this point, the procedure ended. Then 0.25% Marcaine with epinephrine was injected at the tonsillar pillars. The mouth gag was removed. The patient was awakened, extubated, and transferred to the recovery room in stable condition.   FINDINGS:  hypertrophic tonsils    Pradeep Elizalde MD

## 2024-01-23 NOTE — ANESTHESIA POSTPROCEDURE EVALUATION
[facility]    Melissa Rosa Patient Status:  Hospital Outpatient Surgery   Age/Gender 28 year old female MRN [id number]   Location [facility] POST ANESTHESIA CARE UNIT Attending Pradeep Elizalde MD   Hosp Day # 0 PCP Chase Kim MD       Anesthesia Post-op Note    Bilateral Tonsillectomy    Procedure Summary       Date: 01/23/24 Room / Location:  MAIN OR 02 /  MAIN OR    Anesthesia Start: 0730 Anesthesia Stop:     Procedure: Bilateral Tonsillectomy (Bilateral) Diagnosis:       Chronic tonsillitis      (Chronic tonsillitis [J35.01])    Surgeons: Pradeep Elizalde MD Anesthesiologist: Ángel Gill MD    Anesthesia Type: general ASA Status: 2            Anesthesia Type: general    Vitals Value Taken Time   BP 97/46 01/23/24 0813   Temp 97.8 °F (36.6 °C) 01/23/24 0813   Pulse 77 01/23/24 0813   Resp 15 01/23/24 0813   SpO2 97 % 01/23/24 0813   Vitals shown include unfiled device data.    Patient Location: PACU    Anesthesia Type: general    Airway Patency: patent    Postop Pain Control: adequate    Mental Status: mildly sedated but able to meaningfully participate in the post-anesthesia evaluation    Nausea/Vomiting: none    Cardiopulmonary/Hydration status: stable euvolemic    Complications: no apparent anesthesia related complications    Postop vital signs: stable    Dental Exam: Unchanged from Preop    Patient to be discharged from PACU when criteria met.

## 2024-01-23 NOTE — ANESTHESIA PREPROCEDURE EVALUATION
PRE-OP EVALUATION    Patient Name: Melissa Rosa    Admit Diagnosis: Chronic tonsillitis [J35.01]    Pre-op Diagnosis: Chronic tonsillitis [J35.01]    Bilateral Tonsillectomy    Anesthesia Procedure: Bilateral Tonsillectomy (Bilateral)    Surgeon(s) and Role:     * Pradeep Elizalde MD - Primary    Pre-op vitals reviewed.  Temp: 98.6 °F (37 °C)  Pulse: 79  Resp: 18  BP: 129/63  SpO2: 100 %  Body mass index is 25.79 kg/m².    Current medications reviewed.  Hospital Medications:   acetaminophen (Tylenol Extra Strength) tab 1,000 mg  1,000 mg Oral Once    scopolamine (Transderm-Scop) 1 MG/3DAYS patch 1 patch  1 patch Transdermal Once    lactated ringers infusion   Intravenous Continuous       Outpatient Medications:     Medications Prior to Admission   Medication Sig Dispense Refill Last Dose    FLUoxetine 20 MG Oral Cap Take 1 capsule (20 mg total) by mouth daily. 90 capsule 0 1/23/2024 at 0330    famotidine 20 MG Oral Tab Take 1 tablet (20 mg total) by mouth daily.   1/23/2024 at 0330       Allergies: Patient has no known allergies.      Anesthesia Evaluation    Patient summary reviewed.    Anesthetic Complications  (+) history of anesthetic complications  History of: PONV       GI/Hepatic/Renal      (+) GERD                           Cardiovascular            MET: >4                                           Endo/Other    Negative endo/other ROS.                              Pulmonary    Negative pulmonary ROS.                       Neuro/Psych      (+) depression  (+) anxiety                              Past Surgical History:   Procedure Laterality Date    APPENDECTOMY      APPENDECTOMY      EGD      HAND SURGERY Right 2013     Social History     Socioeconomic History    Marital status: Single   Tobacco Use    Smoking status: Former     Types: Cigarettes    Smokeless tobacco: Never   Vaping Use    Vaping Use: Never used   Substance and Sexual Activity    Alcohol use: Yes     Comment: SOCIALLY    Drug use:  Never     History   Drug Use Unknown     Available pre-op labs reviewed.               Airway      Mallampati: I  Mouth opening: >3 FB  TM distance: > 6 cm  Neck ROM: full Cardiovascular    Cardiovascular exam normal.         Dental    Dentition appears grossly intact         Pulmonary    Pulmonary exam normal.                 Other findings              ASA: 2   Plan: general  NPO status verified and patient meets guidelines.    Post-procedure pain management plan discussed with surgeon and patient.      Plan/risks discussed with: patient                Present on Admission:  **None**

## 2024-01-23 NOTE — ANESTHESIA PROCEDURE NOTES
Airway  Date/Time: 1/23/2024 7:35 AM  Urgency: elective    Airway not difficult    General Information and Staff    Patient location during procedure: OR  Anesthesiologist: Ángel Gill MD  Performed: anesthesiologist   Performed by: Ángel Gill MD  Authorized by: Ángel Gill MD      Indications and Patient Condition  Indications for airway management: anesthesia  Sedation level: deep  Preoxygenated: yes  Patient position: sniffing  Mask difficulty assessment: 1 - vent by mask    Final Airway Details  Final airway type: endotracheal airway      Successful airway: ETT and oral SEGUNDO  Cuffed: yes   Successful intubation technique: direct laryngoscopy  Endotracheal tube insertion site: oral  Blade: Kayleigh  Blade size: #3  ETT size (mm): 6.5    Cormack-Lehane Classification: grade I - full view of glottis  Placement verified by: capnometry   Cuff volume (mL): 5  Measured from: lips  ETT to lips (cm): 21  Number of attempts at approach: 1  Number of other approaches attempted: 0

## 2024-01-25 ENCOUNTER — PATIENT MESSAGE (OUTPATIENT)
Facility: LOCATION | Age: 29
End: 2024-01-25

## 2024-01-25 NOTE — TELEPHONE ENCOUNTER
From: Melissa Rosa  To: Pradeep Elizalde  Sent: 1/25/2024 2:09 PM CST  Subject: FMLA Return to Work Form    Hello,    I have attached the form that needs to be filled out in order for me to return to work on 2/6/24. If you could please fill it out and fax it to #[phone]. Thank you.    Melissa Rosa

## 2024-01-30 ENCOUNTER — OFFICE VISIT (OUTPATIENT)
Facility: LOCATION | Age: 29
End: 2024-01-30
Payer: COMMERCIAL

## 2024-01-30 DIAGNOSIS — J35.01 CHRONIC TONSILLITIS: Primary | ICD-10-CM

## 2024-01-30 PROCEDURE — 99024 POSTOP FOLLOW-UP VISIT: CPT | Performed by: OTOLARYNGOLOGY

## 2024-01-30 NOTE — PROGRESS NOTES
She is postop tonsillectomy doing well.  She has had no bleeding.    Tonsil beds are healing well.    She will see us back as needed.

## 2024-02-01 ENCOUNTER — TELEPHONE (OUTPATIENT)
Facility: LOCATION | Age: 29
End: 2024-02-01

## 2024-02-11 ENCOUNTER — TELEPHONE (OUTPATIENT)
Dept: INTERNAL MEDICINE CLINIC | Facility: HOSPITAL | Age: 29
End: 2024-02-11

## 2024-02-11 NOTE — TELEPHONE ENCOUNTER
Outside Covid Testing done    Results and RTW guidelines:    COVID RESULT reported:      Test type:    [] Rapid outside         [] PCR outside       [x] Home Test    Date of test: 02/11/2024     Test location: Home         [] Result viewed in Epic with verbal consent received from employee     [x] Results per Employee Covid Dashboard    [] Employee instructed to email copy of result to [email or web address]       [x] Discussed with employee     [] Unable to reach by phone.  Sent via [employer] message        [x] Positive    - Employee should quarantine at home for at least 5 days (day 1 is day after sx onset) , follow the CDC guidelines for cleaning and                              quarantining; see CDC.gov   -This employee may RTW on day 6 if asymptomatic or mildly symptomatic (with improving symptoms).  Call Employee Health on day 5 if unable to return on                      day 6 after symptom onset.    -This employee needs to call Employee Health on day 5 after symptom onset.  The employee needs to be cleared by Employee Health.  - If Employee is still experiencing severe symptoms must make a RTW appt with Employee Health, Employee will not be cleared if:    1. Has consistent cough, shortness of breath or fatigue that restricts your physical activities    2. Is still feeling \"unwell\"    3. Within 15 days of hospitalization for COVID    4. Within 20 days of intubation for COVID    5. Still has a fever, vomiting or diarrhea   - Keep communication open with management about RTW and if symptoms worsen    - Monitor sx and temperature    - Employee should quarantine at home for at least 5 days from sx onset, follow the CDC guidelines for cleaning and quarantining; see CDC.gov                  - Notify PCP of result                 - Seek emergent care with worsening symptoms        Notes:     RTW PLAN:    [x]  If COVID positive results, off work minimum of 5 days from positive test or onset of symptoms (day 0)         On day 5, if asymptomatic or mildly symptomatic (with improving symptoms) may return to work day 6          On day 5, if symptomatic, call Employee Health for RTW screening        []  COVID positive result - call Employee Health on day 5 after symptom onset.  The employee needs to be cleared by Employee Health to RTW.  [] RTW immediately, continue to monitor for sx  [] RTW when sx improve; must be fever free for 24 hours w/o medications, Diarrhea/Vomiting free for 24 hours w/o medications  [] Alinity ordered; continue to monitor sx and call for new/worsening sx.  Discuss RTW guidelines with manager             [] Rapid ordered to confirm home negative.   [] May continue to work  [x] Follow up with PCP  [] Home until further instruction from hotline with Alinity results  INSTRUCTIONS PROVIDED:  [x]  Plan as noted above  []  Length of time to obtain results  []  May return to work if views negative in My Chart and  remains fever, vomiting, and diarrhea free  []  May continue to work if remains asymptomatic   [x]  Quarantine instructions  [x]  Masking protocol  []  S/S of worsening infection/condition and importance of prompt medical re-evaluation including when to seek emergency care  [] If symptoms develop, stay home and call hotline for rapid test order    Estimated RTW date:  02/16/2024  [x] Manager notified        [x] EH  []JONEL   [] [facility]  Manager : Kim Kaiser    [x] Direct Patient Care  []Indirect Patient Contact   [] Non-Clinical/No Patient Contact    For Direct Patient Care ONLY: Have you been fitted with an N95 mask? [x] Yes  []No      HAVE YOU RECEIVED THE COVID-19 Vaccine? Yes [x]    No []          If yes, date(s) received: 01/21/2021; 02/15/2021           Which vaccine:  Pfizer [x]     Moderna []    J&J []      SYMPTOMS (reported via dashboard):  [] asymptomatic  [x] symptomatic  [] GI symptoms only    Symptom onset date: 02/10/2024    Fever   > 100F             Yes []      Cough                           Yes [x]      Shortness of breath  Yes []      Congestion                 Yes [x]      Runny nose                Yes [x]        Loss of Smell              Yes []        Loss of Taste             Yes []       Sore throat                 Yes [x]       Fatigue                        Yes [x]       Body Aches                Yes [x]        Chills                           Yes []        Headache                   Yes [x]             GI symptoms             Yes [x]     No []                     Nausea   [x]          Vomiting            [x]                                    Diarrhea  []          Upset stomach [x]      Employee reported COVID Exposure?  Yes [x]     No []    Date of exposure: 02/08/2024  []  Coworker                       [x] patient                        [] Family/friend    PPE:   [] N95 Mask/PAPR  [x] Standard Mask  [] Eyewear  [] None    Within 6 feet for >15 minutes? [x] Yes []  No    Is this a true exposure? [x]  Yes []  No    When was the last shift you worked?: 02/10/2024    Employee has a history of Covid?  Yes [x]     No []   If Yes, when: 01/2022    Notes:

## 2024-04-05 DIAGNOSIS — F41.9 ANXIETY: ICD-10-CM

## 2024-04-05 RX ORDER — FLUOXETINE HYDROCHLORIDE 20 MG/1
20 CAPSULE ORAL DAILY
Qty: 90 CAPSULE | Refills: 0 | Status: SHIPPED | OUTPATIENT
Start: 2024-04-05

## 2024-04-12 ENCOUNTER — LAB ENCOUNTER (OUTPATIENT)
Dept: LAB | Facility: HOSPITAL | Age: 29
End: 2024-04-12
Payer: COMMERCIAL

## 2024-04-12 DIAGNOSIS — Z32.01 PREGNANCY TEST POSITIVE (HCC): ICD-10-CM

## 2024-04-12 LAB — B-HCG SERPL-ACNC: 65 MIU/ML

## 2024-04-12 PROCEDURE — 84702 CHORIONIC GONADOTROPIN TEST: CPT

## 2024-04-12 PROCEDURE — 36415 COLL VENOUS BLD VENIPUNCTURE: CPT

## 2024-05-12 ENCOUNTER — TELEPHONE (OUTPATIENT)
Facility: CLINIC | Age: 29
End: 2024-05-12

## 2024-05-12 DIAGNOSIS — O36.80X0 PREGNANCY WITH UNCERTAIN FETAL VIABILITY, SINGLE OR UNSPECIFIED FETUS (HCC): Primary | ICD-10-CM

## 2024-05-12 PROBLEM — F41.9 ANXIETY DISORDER AFFECTING PREGNANCY, ANTEPARTUM (HCC): Status: ACTIVE | Noted: 2024-05-12

## 2024-05-12 PROBLEM — F32.A DEPRESSION AFFECTING PREGNANCY IN FIRST TRIMESTER, ANTEPARTUM (HCC): Status: ACTIVE | Noted: 2024-05-12

## 2024-05-12 PROBLEM — O26.891 HEARTBURN DURING PREGNANCY IN FIRST TRIMESTER (HCC): Status: ACTIVE | Noted: 2024-05-12

## 2024-05-12 PROBLEM — O99.611 CONSTIPATION DURING PREGNANCY IN FIRST TRIMESTER (HCC): Status: ACTIVE | Noted: 2024-05-12

## 2024-05-12 PROBLEM — O21.9 NAUSEA AND VOMITING IN PREGNANCY (HCC): Status: ACTIVE | Noted: 2024-05-12

## 2024-05-12 PROBLEM — O99.341 DEPRESSION AFFECTING PREGNANCY IN FIRST TRIMESTER, ANTEPARTUM (HCC): Status: ACTIVE | Noted: 2024-05-12

## 2024-05-12 PROBLEM — R79.89 LOW VITAMIN B12 LEVEL: Status: ACTIVE | Noted: 2021-10-08

## 2024-05-12 PROBLEM — O99.340 ANXIETY DISORDER AFFECTING PREGNANCY, ANTEPARTUM (HCC): Status: ACTIVE | Noted: 2024-05-12

## 2024-05-12 PROBLEM — Z79.899 ON SSRI THERAPY: Status: ACTIVE | Noted: 2024-05-12

## 2024-05-12 PROBLEM — R12 HEARTBURN DURING PREGNANCY IN FIRST TRIMESTER (HCC): Status: ACTIVE | Noted: 2024-05-12

## 2024-05-12 PROBLEM — K59.00 CONSTIPATION DURING PREGNANCY IN FIRST TRIMESTER (HCC): Status: ACTIVE | Noted: 2024-05-12

## 2024-05-13 ENCOUNTER — ULTRASOUND ENCOUNTER (OUTPATIENT)
Facility: CLINIC | Age: 29
End: 2024-05-13
Payer: COMMERCIAL

## 2024-05-13 PROBLEM — Z12.39 SCREENING BREAST EXAMINATION: Status: ACTIVE | Noted: 2024-05-13

## 2024-05-13 PROBLEM — E66.3 OVERWEIGHT (BMI 25.0-29.9): Status: ACTIVE | Noted: 2024-05-13

## 2024-05-13 LAB — CYTOLOGY CVX/VAG DOC THIN PREP: NORMAL

## 2024-05-13 PROCEDURE — 87086 URINE CULTURE/COLONY COUNT: CPT | Performed by: OBSTETRICS & GYNECOLOGY

## 2024-05-13 PROCEDURE — 87491 CHLMYD TRACH DNA AMP PROBE: CPT | Performed by: OBSTETRICS & GYNECOLOGY

## 2024-05-13 PROCEDURE — 81003 URINALYSIS AUTO W/O SCOPE: CPT | Performed by: OBSTETRICS & GYNECOLOGY

## 2024-05-13 PROCEDURE — 88175 CYTOPATH C/V AUTO FLUID REDO: CPT | Performed by: OBSTETRICS & GYNECOLOGY

## 2024-05-13 PROCEDURE — 87591 N.GONORRHOEAE DNA AMP PROB: CPT | Performed by: OBSTETRICS & GYNECOLOGY

## 2024-05-15 ENCOUNTER — LAB ENCOUNTER (OUTPATIENT)
Dept: LAB | Facility: HOSPITAL | Age: 29
End: 2024-05-15
Attending: OBSTETRICS & GYNECOLOGY

## 2024-05-15 DIAGNOSIS — Z34.01 PREGNANCY, FIRST, FIRST TRIMESTER (HCC): ICD-10-CM

## 2024-05-15 DIAGNOSIS — R79.89 LOW VITAMIN B12 LEVEL: ICD-10-CM

## 2024-05-15 PROBLEM — O36.80X0 PREGNANCY WITH UNCERTAIN FETAL VIABILITY (HCC): Status: RESOLVED | Noted: 2024-05-12 | Resolved: 2024-05-15

## 2024-05-15 LAB
ALBUMIN SERPL-MCNC: 3.5 G/DL (ref 3.4–5)
ALBUMIN/GLOB SERPL: 0.9 {RATIO} (ref 1–2)
ALP LIVER SERPL-CCNC: 20 U/L
ALT SERPL-CCNC: 17 U/L
ANION GAP SERPL CALC-SCNC: 8 MMOL/L (ref 0–18)
ANTIBODY SCREEN: NEGATIVE
AST SERPL-CCNC: 10 U/L (ref 15–37)
BASOPHILS # BLD AUTO: 0.06 X10(3) UL (ref 0–0.2)
BASOPHILS NFR BLD AUTO: 0.5 %
BILIRUB SERPL-MCNC: 0.3 MG/DL (ref 0.1–2)
BUN BLD-MCNC: 8 MG/DL (ref 9–23)
CALCIUM BLD-MCNC: 9.2 MG/DL (ref 8.5–10.1)
CHLORIDE SERPL-SCNC: 106 MMOL/L (ref 98–112)
CO2 SERPL-SCNC: 23 MMOL/L (ref 21–32)
CREAT BLD-MCNC: 0.8 MG/DL
DEPRECATED HBV CORE AB SER IA-ACNC: 25.1 NG/ML
EGFRCR SERPLBLD CKD-EPI 2021: 104 ML/MIN/1.73M2 (ref 60–?)
EOSINOPHIL # BLD AUTO: 0.12 X10(3) UL (ref 0–0.7)
EOSINOPHIL NFR BLD AUTO: 1 %
ERYTHROCYTE [DISTWIDTH] IN BLOOD BY AUTOMATED COUNT: 13.2 %
FASTING STATUS PATIENT QL REPORTED: NO
GLOBULIN PLAS-MCNC: 3.9 G/DL (ref 2.8–4.4)
GLUCOSE BLD-MCNC: 87 MG/DL (ref 70–99)
HBV CORE AB SERPL QL IA: NONREACTIVE
HBV SURFACE AG SER-ACNC: <0.1 [IU]/L
HBV SURFACE AG SERPL QL IA: NONREACTIVE
HCT VFR BLD AUTO: 39.1 %
HCV AB SERPL QL IA: NONREACTIVE
HGB BLD-MCNC: 12.5 G/DL
IMM GRANULOCYTES # BLD AUTO: 0.04 X10(3) UL (ref 0–1)
IMM GRANULOCYTES NFR BLD: 0.3 %
LYMPHOCYTES # BLD AUTO: 2.53 X10(3) UL (ref 1–4)
LYMPHOCYTES NFR BLD AUTO: 21.5 %
MCH RBC QN AUTO: 27.1 PG (ref 26–34)
MCHC RBC AUTO-ENTMCNC: 32 G/DL (ref 31–37)
MCV RBC AUTO: 84.8 FL
MONOCYTES # BLD AUTO: 0.67 X10(3) UL (ref 0.1–1)
MONOCYTES NFR BLD AUTO: 5.7 %
NEUTROPHILS # BLD AUTO: 8.33 X10 (3) UL (ref 1.5–7.7)
NEUTROPHILS # BLD AUTO: 8.33 X10(3) UL (ref 1.5–7.7)
NEUTROPHILS NFR BLD AUTO: 71 %
OSMOLALITY SERPL CALC.SUM OF ELEC: 282 MOSM/KG (ref 275–295)
PLATELET # BLD AUTO: 291 10(3)UL (ref 150–450)
POTASSIUM SERPL-SCNC: 4.2 MMOL/L (ref 3.5–5.1)
PROT SERPL-MCNC: 7.4 G/DL (ref 6.4–8.2)
RBC # BLD AUTO: 4.61 X10(6)UL
RH BLOOD TYPE: POSITIVE
RUBV IGG SER QL: POSITIVE
RUBV IGG SER-ACNC: 387.4 IU/ML (ref 10–?)
SODIUM SERPL-SCNC: 137 MMOL/L (ref 136–145)
T PALLIDUM AB SER QL IA: NONREACTIVE
VIT B12 SERPL-MCNC: 371 PG/ML (ref 193–986)
WBC # BLD AUTO: 11.8 X10(3) UL (ref 4–11)

## 2024-05-15 PROCEDURE — 86762 RUBELLA ANTIBODY: CPT

## 2024-05-15 PROCEDURE — 87389 HIV-1 AG W/HIV-1&-2 AB AG IA: CPT

## 2024-05-15 PROCEDURE — 86780 TREPONEMA PALLIDUM: CPT

## 2024-05-15 PROCEDURE — 86850 RBC ANTIBODY SCREEN: CPT

## 2024-05-15 PROCEDURE — 82728 ASSAY OF FERRITIN: CPT

## 2024-05-15 PROCEDURE — 86901 BLOOD TYPING SEROLOGIC RH(D): CPT

## 2024-05-15 PROCEDURE — 80053 COMPREHEN METABOLIC PANEL: CPT

## 2024-05-15 PROCEDURE — 86900 BLOOD TYPING SEROLOGIC ABO: CPT

## 2024-05-15 PROCEDURE — 86803 HEPATITIS C AB TEST: CPT

## 2024-05-15 PROCEDURE — 82607 VITAMIN B-12: CPT

## 2024-05-15 PROCEDURE — 36415 COLL VENOUS BLD VENIPUNCTURE: CPT

## 2024-05-15 PROCEDURE — 86704 HEP B CORE ANTIBODY TOTAL: CPT

## 2024-05-15 PROCEDURE — 87340 HEPATITIS B SURFACE AG IA: CPT

## 2024-05-15 PROCEDURE — 85025 COMPLETE CBC W/AUTO DIFF WBC: CPT

## 2024-05-16 NOTE — PROGRESS NOTES
Pt aware of results & recommendations:   Baseline CMP noted. Blood type AB+    Vitamin B12 on the lower side of normal though improved from level in 2021.  -may benefit from some additional vitamin B12 1000 mcg daily - try to take separately from prenatal vitamin. Methylcobalamin is preferred to cyanocobalamin form.    Ferritin (bioavailable iron) is on lower side as well. Goal is >50  -given so early in pregnancy & having GI issues right now, just try to be consistent with a prenatal vitamin that contains 27 mg elemental iron daily. May wish to try to add in some extra iron when feeling better. See below  -Add in ferrous sulfate 325 mg (65 mg elemental iron) tablet at least 4 hr apart from prenatal vitamin three days per week (M,W,F) at least 4 hr apart from prenatal vitamin & ideally with vitamin C containing foods (e.g. pomegranate) to increase absorption and avoiding calcium containing foods as this can decrease absorption.    Iron can cause some constipation. Increase water & fiber (especially leafy greens.) Can add stool softener like colace (docusate) or Miralax if needed.    Await rest of prenatal labs.    Verbalized understanding.

## 2024-05-30 NOTE — PROGRESS NOTES
Wellness Exam    CC: Patient is presenting for a wellness exam    HPI:   Concerns:   Pt is 11wks pregnant.   Treating with Promethazine for N/V with OB.  Stopped Prozac due N&V.    Pertinent Family History:   Family History   Problem Relation Age of Onset    No Known Problems Mother     Hypertension Father     Other (peptic ulcer) Father     No Known Problems Brother     No Known Problems Brother     Thyroid disease Maternal Grandmother     Cancer Paternal Grandfather         prostate cancer    Prostate Cancer Paternal Grandfather     Birth Defects Neg     Genetic Disease Neg     Clotting Disorder Neg     DVT/VTE Neg     Breast Cancer Neg     Ovarian Cancer Neg     Colon Cancer Neg     Diabetes Neg     Infertility Neg         Gyne:     Health Maintenance   Topic Date Due    Pap Smear  05/13/2027            Denies pelvic pain, abnormal discharge or genital lesions.    Last mammogram:  No recommendations at this time   Regular self breast exam: discussed.    Bone Health: Last DEXA: N/A  Osteoporosis prevention: weight resistant exercises, check vitamin D  Last colonoscopy:  No recommendations at this time     Reported Health: good.  Diet is regular, exercise: intermittent.    Past Medical History:    Acne vulgaris    took spironolactone & OCP Kamala    Allergic rhinitis, seasonal    Anxiety    as of 10/2021 x 6 months - Onset was approximately 6 months ago, unchanged since that time.she reports she had a break-up in April, and has not been able to recover from it, Counseling, fluoxetine.    Chickenpox    Chronic gastritis    Closed fracture of elbow    Constipation    5/1/24 ED visit at approx 7 wk gestation - c/o LLQ pain & constipation x 2 weeks. Vomited x 2. [facility] Emergency Department - Ultrasound shows live IUP. Labs are unremarkable. Patient was given enema x 1 and had relief of symptoms after 2 bowel movements. US 5/1/24 with SLIUP measuring 6w5d    COVID-19    Decorative tattoo     Depression    as of 10/2021 x 6 months -  Onset was approximately 6 months ago, unchanged since that time.she reports she had a break-up in April, and has not been able to recover from it, Counseling, fluoxetine.    Eczema    Encopresis    Fatigue    GERD (gastroesophageal reflux disease)    2021 - EGD w mild esophagitis.    Hx of motion sickness    Low vitamin B12 level    Vit B12 197    Myalgia    Nausea    Needlestick injury accident with exposure to body fluid    Pap smear for cervical cancer screening    Pap negative - Reyes, LILLIAN Culp, CNP - [facility]. CareEverywhere    PONV (postoperative nausea and vomiting)    SEVERE PONV    Recurrent herpetic carla    vs dyshidrotic eczema    UTI due to Klebsiella species    Walk in clinic. Klebsiella penumonia UTI 10-50k - resistant to bactrim, intermediate to nitrofurantoin, susceptible to augmentin & cefazolin    Visual impairment    CONTACTS/GLASSES    Wears glasses    Well woman exam    4/12/22 - Reyes, Olga LIZ, LILLIAN, CNP - [facility] - Annual Well Woman Exam. Last pap 2021. No h/o abn pap. Started spironolactone for acne. Rx Kamala.     Past Surgical History:   Procedure Laterality Date    Egd  08/31/2021    EGD w mild esophagitis & reactive gastropathy - Jan Diaz MD [facility]    Hand surgery Right 2013    fracture - 4 screws during cheerleading    Laparoscopic appendectomy      in 5th grade. Not ruptured    Tonsillectomy  01/23/2024    chronic tonsillitis Pradeep Elizalde MD     Social History     Socioeconomic History    Marital status: Single   Tobacco Use    Smoking status: Never    Smokeless tobacco: Never   Vaping Use    Vaping status: Never Used   Substance and Sexual Activity    Alcohol use: Not Currently    Drug use: Never     Current Outpatient Medications on File Prior to Visit   Medication Sig Dispense Refill    Vitamin B12 100 MCG Oral Tab       ondansetron 4 MG Oral Tablet  Dispersible DISSOLVE 1 TABLET IN MOUTH EVERY 6 HOURS AS NEEDED      Psyllium (METAMUCIL 3 IN 1 DAILY FIBER OR)       promethazine 25 MG Oral Tab Take 1 tablet (25 mg total) by mouth every 6 (six) hours as needed for Nausea. 30 tablet 1    prenatal vitamin with DHA 27-0.8-228 MG Oral Cap Take 1 capsule by mouth daily.      pyridoxine 100 MG Oral Tab Take 1 tablet (100 mg total) by mouth daily.      Doxylamine Succinate, Sleep, (UNISOM OR) Take by mouth.      famotidine 20 MG Oral Tab Take 1 tablet (20 mg total) by mouth daily.      FLUOXETINE 20 MG Oral Cap TAKE 1 CAPSULE BY MOUTH EVERY DAY (Patient not taking: Reported on 5/13/2024) 90 capsule 0     No current facility-administered medications on file prior to visit.       Review of Systems   Review of Systems   Constitutional: Negative for fever, chills and fatigue.   HENT: Negative for hearing loss, congestion, sore throat and neck pain.    Eyes: Negative for pain and visual disturbance.   Respiratory: Negative for cough and shortness of breath.    Cardiovascular: Negative for chest pain and palpitations.   Gastrointestinal: Negative for nausea, vomiting, abdominal pain and diarrhea.   Genitourinary: Negative for urgency, frequency of urination, and abnormal vaginal bleeding.   Musculoskeletal: Negative for arthralgias and gait problem.   Skin: Negative for color change and rash.   Neurological: Negative for tremors, weakness and numbness.   Hematological: Negative for adenopathy. Does not bruise/bleed easily.   Psychiatric/Behavioral: Negative for confusion and agitation. The patient is not nervous/anxious.      /54   Pulse 102   Temp 97.7 °F (36.5 °C)   Resp 16   Ht 5' 5\" (1.651 m)   Wt 158 lb (71.7 kg)   LMP 03/11/2024   SpO2 98%   BMI 26.29 kg/m²   Physical Exam  Physical Exam    Constitutional: She is oriented to person, place, and time. She appears well-developed. No distress.    Head: Normocephalic and atraumatic.   Eyes: EOM are normal.  Pupils are equal, round, and reactive to light. No scleral icterus. Fundoscopic exam: No hemorrhages, A/V nicking, exudates or papilledema.  ENT: TM's clear, nose normal, no oropharyngeal exudates or tonsillar hypertrophy    Neck: Normal range of motion. No thyromegaly present.   Cardiovascular: Normal rate, regular rhythm and normal heart sounds.  No murmur or friction rub heard.  Pulmonary/Chest: Effort normal and breath sounds normal bilaterally. She has no wheezes or rales.   Breasts: deferred  Abdominal: Soft. Bowel sounds are normal. There is no tenderness. No HSM.  Musculoskeletal: Normal range of motion. She exhibits no edema.   Lymphadenopathy: She has no cervical, supraclavicular, or axillary adenopathy.   :deffered  Neurological: She is alert and oriented to person, place, and time. DTRs are +2 and symmetric. Cranial nerves grossly intact.  Skin: Skin is warm. No rash noted. No erythema, pallor or jaundice.   Psychiatric: She has a normal mood and affect and her behavior is normal.     Assessment and Plan:  Melissa Rosa is a 29 year old female here for a wellness exam.    Diagnoses and all orders for this visit:    Annual physical exam    Laboratory exam ordered as part of routine general medical examination- recent CMP, CBC was unremarkable  -     Lipid Panel; Future  -     TSH W Reflex To Free T4; Future  -     Urinalysis, Routine; Future    Encounter for vitamin deficiency screening  -     Vitamin D; Future    Depression affecting pregnancy in first trimester, antepartum (HCC)  Anxiety disorder affecting pregnancy, antepartum (HCC)   -managing well off Prozac, will monitor the symptoms.     Age appropriate cancer screening, labs, safety, immunizations were discussed with the patient and ordered as follows:    Health Maintenance Due   Topic Date Due    COVID-19 Vaccine (3 - 2023-24 season) 09/01/2023    Annual Physical  05/30/2024      Orders Placed This Encounter   Procedures    Lipid Panel      Standing Status:   Future     Standing Expiration Date:   5/26/2025    TSH W Reflex To Free T4     Standing Status:   Future     Standing Expiration Date:   5/26/2025    Vitamin D     Standing Status:   Future     Standing Expiration Date:   5/31/2025     Order Specific Question:   Please pick the scenario that best fits the purpose for ordering this test     Answer:   General Screening/Vit D deficiency (25-Hydroxy)     Order Specific Question:   Release to patient     Answer:   Immediate    Urinalysis, Routine     Standing Status:   Future     Standing Expiration Date:   5/31/2025     Discussed use of sunscreen, wearing seatbelt, recommend regular cardiovascular and weight bearing exercise as well as a well-rounded diet.    Her 5 year prevention plan includes: annual physical and labs, 30 minutes exercise most days of week and heart healthy diet  Patient/Caregiver Education:  Patient/Caregiver Education: There are no barriers to learning. Medical education done.  Outcome: Patient verbalizes understanding.       Return in 12m for annual physical.  Educated by: VESNA Torres

## 2024-05-31 ENCOUNTER — OFFICE VISIT (OUTPATIENT)
Dept: INTERNAL MEDICINE CLINIC | Facility: CLINIC | Age: 29
End: 2024-05-31

## 2024-05-31 VITALS
HEIGHT: 65 IN | OXYGEN SATURATION: 98 % | RESPIRATION RATE: 16 BRPM | BODY MASS INDEX: 26.33 KG/M2 | DIASTOLIC BLOOD PRESSURE: 54 MMHG | SYSTOLIC BLOOD PRESSURE: 112 MMHG | WEIGHT: 158 LBS | TEMPERATURE: 98 F | HEART RATE: 102 BPM

## 2024-05-31 DIAGNOSIS — F41.9 ANXIETY DISORDER AFFECTING PREGNANCY, ANTEPARTUM (HCC): ICD-10-CM

## 2024-05-31 DIAGNOSIS — F32.A DEPRESSION AFFECTING PREGNANCY IN FIRST TRIMESTER, ANTEPARTUM (HCC): ICD-10-CM

## 2024-05-31 DIAGNOSIS — Z00.00 LABORATORY EXAM ORDERED AS PART OF ROUTINE GENERAL MEDICAL EXAMINATION: ICD-10-CM

## 2024-05-31 DIAGNOSIS — O99.340 ANXIETY DISORDER AFFECTING PREGNANCY, ANTEPARTUM (HCC): ICD-10-CM

## 2024-05-31 DIAGNOSIS — Z00.00 ANNUAL PHYSICAL EXAM: Primary | ICD-10-CM

## 2024-05-31 DIAGNOSIS — Z13.21 ENCOUNTER FOR VITAMIN DEFICIENCY SCREENING: ICD-10-CM

## 2024-05-31 DIAGNOSIS — O99.341 DEPRESSION AFFECTING PREGNANCY IN FIRST TRIMESTER, ANTEPARTUM (HCC): ICD-10-CM

## 2024-05-31 PROBLEM — Z12.39 SCREENING BREAST EXAMINATION: Status: RESOLVED | Noted: 2024-05-13 | Resolved: 2024-05-31

## 2024-05-31 PROCEDURE — 99395 PREV VISIT EST AGE 18-39: CPT

## 2024-05-31 RX ORDER — MAGNESIUM OXIDE 400 MG (241.3 MG MAGNESIUM) TABLET
TABLET
COMMUNITY

## 2024-05-31 RX ORDER — ONDANSETRON 4 MG/1
TABLET, ORALLY DISINTEGRATING ORAL
COMMUNITY
Start: 2024-05-13

## 2024-06-13 ENCOUNTER — ROUTINE PRENATAL (OUTPATIENT)
Dept: OBGYN CLINIC | Facility: CLINIC | Age: 29
End: 2024-06-13
Payer: COMMERCIAL

## 2024-06-13 ENCOUNTER — TELEPHONE (OUTPATIENT)
Dept: OBGYN CLINIC | Facility: CLINIC | Age: 29
End: 2024-06-13

## 2024-06-13 VITALS
HEART RATE: 91 BPM | DIASTOLIC BLOOD PRESSURE: 81 MMHG | HEIGHT: 65 IN | BODY MASS INDEX: 26.08 KG/M2 | SYSTOLIC BLOOD PRESSURE: 117 MMHG | WEIGHT: 156.5 LBS

## 2024-06-13 DIAGNOSIS — O99.611 CONSTIPATION DURING PREGNANCY IN FIRST TRIMESTER (HCC): ICD-10-CM

## 2024-06-13 DIAGNOSIS — F32.A DEPRESSION AFFECTING PREGNANCY IN FIRST TRIMESTER, ANTEPARTUM (HCC): ICD-10-CM

## 2024-06-13 DIAGNOSIS — O21.9 NAUSEA AND VOMITING IN PREGNANCY (HCC): ICD-10-CM

## 2024-06-13 DIAGNOSIS — Z34.90 PRENATAL CARE, ANTEPARTUM (HCC): Primary | ICD-10-CM

## 2024-06-13 DIAGNOSIS — K59.00 CONSTIPATION DURING PREGNANCY IN FIRST TRIMESTER (HCC): ICD-10-CM

## 2024-06-13 DIAGNOSIS — O99.340 ANXIETY DISORDER AFFECTING PREGNANCY, ANTEPARTUM (HCC): ICD-10-CM

## 2024-06-13 DIAGNOSIS — Z34.92 PRENATAL CARE IN SECOND TRIMESTER (HCC): ICD-10-CM

## 2024-06-13 DIAGNOSIS — O26.891 HEARTBURN DURING PREGNANCY IN FIRST TRIMESTER (HCC): ICD-10-CM

## 2024-06-13 DIAGNOSIS — Z3A.13 13 WEEKS GESTATION OF PREGNANCY (HCC): ICD-10-CM

## 2024-06-13 DIAGNOSIS — Z79.899 ON SSRI THERAPY: ICD-10-CM

## 2024-06-13 DIAGNOSIS — F41.9 ANXIETY DISORDER AFFECTING PREGNANCY, ANTEPARTUM (HCC): ICD-10-CM

## 2024-06-13 DIAGNOSIS — R12 HEARTBURN DURING PREGNANCY IN FIRST TRIMESTER (HCC): ICD-10-CM

## 2024-06-13 DIAGNOSIS — O99.341 DEPRESSION AFFECTING PREGNANCY IN FIRST TRIMESTER, ANTEPARTUM (HCC): ICD-10-CM

## 2024-06-13 DIAGNOSIS — D50.9 IRON DEFICIENCY ANEMIA DURING PREGNANCY (HCC): ICD-10-CM

## 2024-06-13 DIAGNOSIS — O99.019 IRON DEFICIENCY ANEMIA DURING PREGNANCY (HCC): ICD-10-CM

## 2024-06-13 NOTE — TELEPHONE ENCOUNTER
Jason patient's blood for NIPS and carrier screening. Patient confirmed the name and  on all three tubes were correct. Left box on counter.

## 2024-06-13 NOTE — PROGRESS NOTES
ALBERTO 13.3    No concerns  On BSUS 161BPM    Dating: ADRI 24 by LMP c/w 6w5d US & 8w5d US  ED RN at [facility]  - nl pap   -Aneuploidy screening options discussed  cfDNA/NIPS/carrier - today.   -Flu vaccine - done  -COVID-19 vaccination encouraged    - GENDER SURPRISE!!!!    Nausea & vomiting   -B6 & Unisom, zofran PRN    GERD  -famotidine per GI - so far ok     Constipation  -24 ED visit at approx 7 wk gestation - c/o LLQ pain & constipation x 2 weeks. Vomited x 2. [facility] - Ultrasound shows live IUP. Labs are unremarkable. Patient was given enema x 1 and had relief of symptoms after 2 bowel movements.   -colace - not helping enough   -recommend Miralax daily      Depression & anxiety   -meds - NOT currently taking fluoxetine 20 mg daily - Rx per PCP. Stopped due to N&V. She may try to restart this but not sure at this time  - abstinence syndrome reviewed  -Risks benefits of continuing SSRI during pregnancy & postpartum reviewed.   -therapist - in the past.   - referral - declines      Iron deficiency  -5/15: ferritin 25, B12 nl, HgB 12.5 - taking additional PO B12      BMI 25 Overweight   -Wt gain goal 15-25 lb discussed.

## 2024-06-24 ENCOUNTER — TELEPHONE (OUTPATIENT)
Facility: CLINIC | Age: 29
End: 2024-06-24

## 2024-06-24 LAB — AMB EXT MYRIAD FORESIGHT: NEGATIVE

## 2024-07-03 DIAGNOSIS — F41.9 ANXIETY: ICD-10-CM

## 2024-07-03 RX ORDER — FLUOXETINE HYDROCHLORIDE 20 MG/1
20 CAPSULE ORAL DAILY
Qty: 90 CAPSULE | Refills: 0 | Status: SHIPPED | OUTPATIENT
Start: 2024-07-03

## 2024-07-03 NOTE — TELEPHONE ENCOUNTER
Last time medication was refilled 04/05/2024  Last office visit  05/31/2024  Next office visit due/scheduled   Future Appointments   Date Time Provider Department Center   7/16/2024  9:00 AM Caro Krause APRN [facility] [address]   7/16/2024 11:00 AM Yahaira Sosa APRN [facility] [place]   7/30/2024  9:45 AM [facility] [place]     Medication not on protocol.

## 2024-07-15 NOTE — PROGRESS NOTES
Melissa Rosa is a 29 year old female.  HPI:   HPI   Pt presents today with c/o bilateral armpit pain x 1 year, has been worse the last few months. The pain is constant. Notes occasional lump in the right armpit.  Denies rash, itchiness, denies strenuous over the head arm movement, neck pain, numbness/tingling.  Denies night sweats, weight loss, fever.     Pt is currently 18 wks gestational age. Feeling well. Nausea subsided.  Current Outpatient Medications   Medication Sig Dispense Refill    FLUOXETINE 20 MG Oral Cap TAKE 1 CAPSULE BY MOUTH EVERY DAY 90 capsule 0    Vitamin B12 100 MCG Oral Tab       ondansetron 4 MG Oral Tablet Dispersible DISSOLVE 1 TABLET IN MOUTH EVERY 6 HOURS AS NEEDED      Psyllium (METAMUCIL 3 IN 1 DAILY FIBER OR)       promethazine 25 MG Oral Tab Take 1 tablet (25 mg total) by mouth every 6 (six) hours as needed for Nausea. 30 tablet 1    prenatal vitamin with DHA 27-0.8-228 MG Oral Cap Take 1 capsule by mouth daily.      pyridoxine 100 MG Oral Tab Take 1 tablet (100 mg total) by mouth daily.      Doxylamine Succinate, Sleep, (UNISOM OR) Take by mouth.      famotidine 20 MG Oral Tab Take 1 tablet (20 mg total) by mouth daily.        Past Medical History:    Acne vulgaris    took spironolactone & OCP Kamala    Allergic rhinitis, seasonal    Anxiety    as of 10/2021 x 6 months - Onset was approximately 6 months ago, unchanged since that time.she reports she had a break-up in April, and has not been able to recover from it, Counseling, fluoxetine.    Chickenpox    Chronic gastritis    Closed fracture of elbow    Constipation    5/1/24 ED visit at approx 7 wk gestation - c/o LLQ pain & constipation x 2 weeks. Vomited x 2. [facility] Emergency Department - Ultrasound shows live IUP. Labs are unremarkable. Patient was given enema x 1 and had relief of symptoms after 2 bowel movements. US 5/1/24 with SLIUP measuring 6w5d    COVID-19    Decorative tattoo    Depression    as  of 10/2021 x 6 months -  Onset was approximately 6 months ago, unchanged since that time.she reports she had a break-up in April, and has not been able to recover from it, Counseling, fluoxetine.    Eczema    Encopresis    Fatigue    GERD (gastroesophageal reflux disease)    2021 - EGD w mild esophagitis.    Hx of motion sickness    Low vitamin B12 level    Vit B12 197    Myalgia    Nausea    Needlestick injury accident with exposure to body fluid    Pap smear for cervical cancer screening    Pap negative - Olga Chambers APRN, CNP - [facility]. CareEverywhere    PONV (postoperative nausea and vomiting)    SEVERE PONV    Recurrent herpetic carla    vs dyshidrotic eczema    Screening for genetic disease carrier status    [employer] Carrier Screen = Negative    UTI due to Klebsiella species    Walk in clinic. Klebsiella penumonia UTI 10-50k - resistant to bactrim, intermediate to nitrofurantoin, susceptible to augmentin & cefazolin    Visual impairment    CONTACTS/GLASSES    Wears glasses    Well woman exam    4/12/22 - Olga Chambers APRN, CNP - [facility] - Annual Well Woman Exam. Last pap 2021. No h/o abn pap. Started spironolactone for acne. Rx Kamala.      Social History:  Social History     Socioeconomic History    Marital status: Single   Tobacco Use    Smoking status: Never    Smokeless tobacco: Never   Vaping Use    Vaping status: Never Used   Substance and Sexual Activity    Alcohol use: Not Currently    Drug use: Never        REVIEW OF SYSTEMS:   Review of Systems   Constitutional:  Negative for activity change, chills, diaphoresis, fatigue, fever and unexpected weight change.   HENT: Negative.     Respiratory: Negative.     Cardiovascular: Negative.    Endocrine: Negative.    Musculoskeletal: Negative.    Skin: Negative.    Allergic/Immunologic: Negative.    Neurological: Negative.    Psychiatric/Behavioral: Negative.           EXAM:   /78   Pulse 85   Temp  97 °F (36.1 °C) (Temporal)   Resp 16   Ht 5' 5\" (1.651 m)   Wt 160 lb (72.6 kg)   LMP 03/11/2024   SpO2 100%   BMI 26.63 kg/m²   Physical Exam  Vitals and nursing note reviewed.   Constitutional:       Appearance: Normal appearance. She is normal weight.   Cardiovascular:      Rate and Rhythm: Normal rate and regular rhythm.      Pulses: Normal pulses.      Heart sounds: Normal heart sounds.   Pulmonary:      Effort: Pulmonary effort is normal.      Breath sounds: Normal breath sounds.   Musculoskeletal:      Cervical back: Normal range of motion.   Lymphadenopathy:      Upper Body:      Right upper body: Axillary adenopathy present. No supraclavicular or pectoral adenopathy.      Left upper body: Axillary adenopathy present. No supraclavicular or pectoral adenopathy.   Skin:     General: Skin is warm and dry.      Capillary Refill: Capillary refill takes less than 2 seconds.      Findings: No erythema, lesion or rash.   Neurological:      General: No focal deficit present.      Mental Status: She is alert and oriented to person, place, and time. Mental status is at baseline.   Psychiatric:         Mood and Affect: Mood normal.         Behavior: Behavior normal.         Thought Content: Thought content normal.         Judgment: Judgment normal.          ASSESSMENT AND PLAN:   Diagnoses and all orders for this visit:    Pain in right axilla  Armpit pain, left  Lump in armpit, bilateral  18 weeks gestation of pregnancy  -     US AXILLARY BILATERAL (CPT=76882); Future  -     CBC With Differential With Platelet; Future   -advised to complete the rest of the labs ordered in the system    Requested Prescriptions      No prescriptions requested or ordered in this encounter         The patient indicates understanding of these issues and agrees to the plan.  The patient is asked to return if symptoms persist or worsen.

## 2024-07-16 ENCOUNTER — ROUTINE PRENATAL (OUTPATIENT)
Facility: CLINIC | Age: 29
End: 2024-07-16
Payer: COMMERCIAL

## 2024-07-16 ENCOUNTER — LAB ENCOUNTER (OUTPATIENT)
Dept: LAB | Facility: HOSPITAL | Age: 29
End: 2024-07-16
Payer: COMMERCIAL

## 2024-07-16 ENCOUNTER — OFFICE VISIT (OUTPATIENT)
Dept: INTERNAL MEDICINE CLINIC | Facility: CLINIC | Age: 29
End: 2024-07-16
Payer: COMMERCIAL

## 2024-07-16 VITALS
RESPIRATION RATE: 16 BRPM | HEIGHT: 65 IN | SYSTOLIC BLOOD PRESSURE: 120 MMHG | WEIGHT: 160 LBS | OXYGEN SATURATION: 100 % | DIASTOLIC BLOOD PRESSURE: 78 MMHG | TEMPERATURE: 97 F | HEART RATE: 85 BPM | BODY MASS INDEX: 26.66 KG/M2

## 2024-07-16 VITALS
HEIGHT: 65 IN | BODY MASS INDEX: 26.29 KG/M2 | HEART RATE: 78 BPM | DIASTOLIC BLOOD PRESSURE: 66 MMHG | WEIGHT: 157.81 LBS | SYSTOLIC BLOOD PRESSURE: 104 MMHG

## 2024-07-16 DIAGNOSIS — Z34.01 PREGNANCY, FIRST, FIRST TRIMESTER (HCC): ICD-10-CM

## 2024-07-16 DIAGNOSIS — Z13.21 ENCOUNTER FOR VITAMIN DEFICIENCY SCREENING: ICD-10-CM

## 2024-07-16 DIAGNOSIS — Z3A.18 18 WEEKS GESTATION OF PREGNANCY (HCC): ICD-10-CM

## 2024-07-16 DIAGNOSIS — M79.622 ARMPIT PAIN, LEFT: ICD-10-CM

## 2024-07-16 DIAGNOSIS — R22.33 LUMP IN ARMPIT, BILATERAL: ICD-10-CM

## 2024-07-16 DIAGNOSIS — M79.621 PAIN IN RIGHT AXILLA: ICD-10-CM

## 2024-07-16 DIAGNOSIS — Z00.00 LABORATORY EXAM ORDERED AS PART OF ROUTINE GENERAL MEDICAL EXAMINATION: ICD-10-CM

## 2024-07-16 DIAGNOSIS — M79.621 PAIN IN RIGHT AXILLA: Primary | ICD-10-CM

## 2024-07-16 DIAGNOSIS — Z3A.18 18 WEEKS GESTATION OF PREGNANCY (HCC): Primary | ICD-10-CM

## 2024-07-16 LAB
BASOPHILS # BLD AUTO: 0.04 X10(3) UL (ref 0–0.2)
BASOPHILS NFR BLD AUTO: 0.4 %
BILIRUB UR QL STRIP.AUTO: NEGATIVE
CHOLEST SERPL-MCNC: 190 MG/DL (ref ?–200)
CLARITY UR REFRACT.AUTO: CLEAR
COLOR UR AUTO: COLORLESS
EOSINOPHIL # BLD AUTO: 0.06 X10(3) UL (ref 0–0.7)
EOSINOPHIL NFR BLD AUTO: 0.6 %
ERYTHROCYTE [DISTWIDTH] IN BLOOD BY AUTOMATED COUNT: 13.2 %
FASTING PATIENT LIPID ANSWER: NO
GLUCOSE UR STRIP.AUTO-MCNC: NORMAL MG/DL
HBV SURFACE AB SER QL: REACTIVE
HBV SURFACE AB SERPL IA-ACNC: 13.04 MIU/ML
HCT VFR BLD AUTO: 36.8 %
HDLC SERPL-MCNC: 71 MG/DL (ref 40–59)
HGB BLD-MCNC: 11.8 G/DL
IMM GRANULOCYTES # BLD AUTO: 0.06 X10(3) UL (ref 0–1)
IMM GRANULOCYTES NFR BLD: 0.6 %
KETONES UR STRIP.AUTO-MCNC: NEGATIVE MG/DL
LDLC SERPL CALC-MCNC: 96 MG/DL (ref ?–100)
LEUKOCYTE ESTERASE UR QL STRIP.AUTO: NEGATIVE
LYMPHOCYTES # BLD AUTO: 1.59 X10(3) UL (ref 1–4)
LYMPHOCYTES NFR BLD AUTO: 16.4 %
MCH RBC QN AUTO: 27.8 PG (ref 26–34)
MCHC RBC AUTO-ENTMCNC: 32.1 G/DL (ref 31–37)
MCV RBC AUTO: 86.6 FL
MONOCYTES # BLD AUTO: 0.53 X10(3) UL (ref 0.1–1)
MONOCYTES NFR BLD AUTO: 5.5 %
NEUTROPHILS # BLD AUTO: 7.42 X10 (3) UL (ref 1.5–7.7)
NEUTROPHILS # BLD AUTO: 7.42 X10(3) UL (ref 1.5–7.7)
NEUTROPHILS NFR BLD AUTO: 76.5 %
NITRITE UR QL STRIP.AUTO: NEGATIVE
NONHDLC SERPL-MCNC: 119 MG/DL (ref ?–130)
PH UR STRIP.AUTO: 7 [PH] (ref 5–8)
PLATELET # BLD AUTO: 245 10(3)UL (ref 150–450)
PROT UR STRIP.AUTO-MCNC: NEGATIVE MG/DL
RBC # BLD AUTO: 4.25 X10(6)UL
RBC UR QL AUTO: NEGATIVE
SP GR UR STRIP.AUTO: <1.005 (ref 1–1.03)
TRIGL SERPL-MCNC: 133 MG/DL (ref 30–149)
TSI SER-ACNC: 1.61 MIU/ML (ref 0.55–4.78)
UROBILINOGEN UR STRIP.AUTO-MCNC: NORMAL MG/DL
VIT D+METAB SERPL-MCNC: 30.5 NG/ML (ref 30–100)
VLDLC SERPL CALC-MCNC: 22 MG/DL (ref 0–30)
WBC # BLD AUTO: 9.7 X10(3) UL (ref 4–11)

## 2024-07-16 PROCEDURE — 36415 COLL VENOUS BLD VENIPUNCTURE: CPT

## 2024-07-16 PROCEDURE — 82306 VITAMIN D 25 HYDROXY: CPT

## 2024-07-16 PROCEDURE — 80061 LIPID PANEL: CPT

## 2024-07-16 PROCEDURE — 81003 URINALYSIS AUTO W/O SCOPE: CPT

## 2024-07-16 PROCEDURE — 84443 ASSAY THYROID STIM HORMONE: CPT

## 2024-07-16 PROCEDURE — 86706 HEP B SURFACE ANTIBODY: CPT

## 2024-07-16 PROCEDURE — 85025 COMPLETE CBC W/AUTO DIFF WBC: CPT

## 2024-07-16 PROCEDURE — 99214 OFFICE O/P EST MOD 30 MIN: CPT

## 2024-07-16 NOTE — PROGRESS NOTES
ALBERTO 18w1d - visit # 2    She is doing well, no complaints   -anatomy scan discussed - appt     ADRI 24 by LMP c/w 6w5d US & 8w5d US  ED RN at [facility]  -Aneuploidy screening options discussed        NIPS - neg          Carrier neg        -Flu vaccine - done  -COVID-19 vaccination encouraged    - GENDER SURPRISE!!!!     Nausea & vomiting   -B6 & Unisom, zofran PRN - is improving      GERD  -famotidine per GI - doing ok     Constipation  -24 ED visit at approx 7 wk gestation - c/o LLQ pain & constipation x 2 weeks. Vomited x 2. [facility] Department - Ultrasound shows live IUP. Labs are unremarkable. Patient was given enema x 1 and had relief of symptoms after 2 bowel movements.   -taking colace and fiber supplements daily      Depression & anxiety   -meds - IS  currently taking fluoxetine 20 mg daily - Rx per PCP. - she resumed it, because she was feeling more irritable. Is feeling better   - abstinence syndrome reviewed  -Risks benefits of continuing SSRI during pregnancy & postpartum reviewed.   -therapist - in the past.   - referral - declines       Iron deficiency  -5/15: ferritin 25, B12 nl, HgB 12.5 - taking additional PO B12        BMI 25 Overweight   -Wt gain goal 15-25 lb discussed.

## 2024-07-22 ENCOUNTER — HOSPITAL ENCOUNTER (OUTPATIENT)
Dept: ULTRASOUND IMAGING | Facility: HOSPITAL | Age: 29
Discharge: HOME | End: 2024-07-22
Payer: COMMERCIAL

## 2024-07-22 DIAGNOSIS — M79.621 PAIN IN RIGHT AXILLA: ICD-10-CM

## 2024-07-22 DIAGNOSIS — Z3A.18 18 WEEKS GESTATION OF PREGNANCY (HCC): ICD-10-CM

## 2024-07-22 DIAGNOSIS — R22.33 LUMP IN ARMPIT, BILATERAL: ICD-10-CM

## 2024-07-22 DIAGNOSIS — M79.622 ARMPIT PAIN, LEFT: ICD-10-CM

## 2024-07-22 PROCEDURE — 76882 US LMTD JT/FCL EVL NVASC XTR: CPT

## 2024-07-23 NOTE — PROGRESS NOTES
Written by Nanda Baltazar RN on 7/22/2024  4:30 PM CDT View Full Comments  Seen by patient Melissa BARRETT Josueleti on 7/22/2024  7:16 PM

## 2024-07-23 NOTE — TELEPHONE ENCOUNTER
Patient is a new consult referred by LILLIAN Torres for Dr. Connolly.  Diagnosis:   M79.621 (ICD-10-CM) - 729.5 (ICD-9-CM) - Pain in right axilla  M79.622 (ICD-10-CM) - 729.5 (ICD-9-CM) - Armpit pain, left   Called 7/23/24

## 2024-07-30 ENCOUNTER — ULTRASOUND ENCOUNTER (OUTPATIENT)
Facility: CLINIC | Age: 29
End: 2024-07-30
Payer: COMMERCIAL

## 2024-07-30 DIAGNOSIS — Z36.2 ENCOUNTER FOR FOLLOW-UP ULTRASOUND OF FETAL ANATOMY (HCC): Primary | ICD-10-CM

## 2024-07-31 ENCOUNTER — OFFICE VISIT (OUTPATIENT)
Dept: HEMATOLOGY/ONCOLOGY | Facility: HOSPITAL | Age: 29
End: 2024-07-31
Attending: INTERNAL MEDICINE
Payer: COMMERCIAL

## 2024-07-31 VITALS
SYSTOLIC BLOOD PRESSURE: 117 MMHG | BODY MASS INDEX: 26.82 KG/M2 | DIASTOLIC BLOOD PRESSURE: 65 MMHG | TEMPERATURE: 98 F | WEIGHT: 161 LBS | HEART RATE: 83 BPM | OXYGEN SATURATION: 100 % | RESPIRATION RATE: 16 BRPM | HEIGHT: 65 IN

## 2024-07-31 DIAGNOSIS — R59.0 AXILLARY LYMPHADENOPATHY: Primary | ICD-10-CM

## 2024-07-31 PROCEDURE — 99204 OFFICE O/P NEW MOD 45 MIN: CPT | Performed by: INTERNAL MEDICINE

## 2024-07-31 RX ORDER — FERROUS SULFATE 325(65) MG
325 TABLET, DELAYED RELEASE (ENTERIC COATED) ORAL
COMMUNITY

## 2024-07-31 NOTE — CONSULTS
Hematology Oncology Consultation Note    Patient Name: Melissa Rosa   YOB: 1995   Medical Record Number: [id number]   CSN: [id number]   Consulting Physician: Dotty Connolly MD  Referring Physician(s): No ref. provider found  Date of Consultation: 7/31/2024     Reason for Consultation:    Encounter Diagnoses   Name Primary?    Axillary lymphadenopathy Yes   .      History of Present Illness:   Melissa Barber a 29 year old White female with        Past Medical History:  Past Medical History:    Acne vulgaris    took spironolactone & OCP Kamala    Allergic rhinitis, seasonal    Anxiety    as of 10/2021 x 6 months - Onset was approximately 6 months ago, unchanged since that time.she reports she had a break-up in April, and has not been able to recover from it, Counseling, fluoxetine.    Chickenpox    Chronic gastritis    Closed fracture of elbow    Constipation    5/1/24 ED visit at approx 7 wk gestation - c/o LLQ pain & constipation x 2 weeks. Vomited x 2. [facility] - Ultrasound shows live IUP. Labs are unremarkable. Patient was given enema x 1 and had relief of symptoms after 2 bowel movements. US 5/1/24 with SLIUP measuring 6w5d    COVID-19    Decorative tattoo    Depression    as of 10/2021 x 6 months -  Onset was approximately 6 months ago, unchanged since that time.she reports she had a break-up in April, and has not been able to recover from it, Counseling, fluoxetine.    Eczema    Encopresis    Fatigue    GERD (gastroesophageal reflux disease)    2021 - EGD w mild esophagitis.    Hx of motion sickness    Low vitamin B12 level    Vit B12 197    Myalgia    Nausea    Needlestick injury accident with exposure to body fluid    Pap smear for cervical cancer screening    Pap negative - Olga Chambers, APRN, CNP - [facility]. CareEverywhere    PONV (postoperative nausea and vomiting)    SEVERE PONV    Recurrent herpetic carla    vs  dyshidrotic eczema    Screening for genetic disease carrier status    [employer] Carrier Screen = Negative    UTI due to Klebsiella species    Walk in clinic. Klebsiella penumonia UTI 10-50k - resistant to bactrim, intermediate to nitrofurantoin, susceptible to augmentin & cefazolin    Visual impairment    CONTACTS/GLASSES    Wears glasses    Well woman exam    22 - Olga Chambers APRN, CNP - [facility] - Annual Well Woman Exam. Last pap . No h/o abn pap. Started spironolactone for acne. Rx Kamala.       Past Surgical History:  Past Surgical History:   Procedure Laterality Date    Egd  2021    EGD w mild esophagitis & reactive gastropathy - Jan Diaz MD [facility]    Hand surgery Right 2013    fracture - 4 screws during cheerleading    Laparoscopic appendectomy      in 5th grade. Not ruptured    Tonsillectomy  2024    chronic tonsillitis Pradeep Elizalde MD       Family Medical History:  Family History   Problem Relation Age of Onset    No Known Problems Mother     Hypertension Father     Other (peptic ulcer) Father     No Known Problems Brother     No Known Problems Brother     Thyroid disease Maternal Grandmother     Cancer Paternal Grandfather         prostate cancer    Prostate Cancer Paternal Grandfather     Birth Defects Neg     Genetic Disease Neg     Clotting Disorder Neg     DVT/VTE Neg     Breast Cancer Neg     Ovarian Cancer Neg     Colon Cancer Neg     Diabetes Neg     Infertility Neg        Gyne History:  OB History    Para Term  AB Living   1 0 0 0 0 0   SAB IAB Ectopic Multiple Live Births   0 0 0 0 0       Psychosocial History:  Social History     Socioeconomic History    Marital status: Single     Spouse name: Not on file    Number of children: Not on file    Years of education: Not on file    Highest education level: Not on file   Occupational History    Not on file   Tobacco Use    Smoking status: Never    Smokeless tobacco:  Never   Vaping Use    Vaping status: Never Used   Substance and Sexual Activity    Alcohol use: Not Currently    Drug use: Never    Sexual activity: Not on file   Other Topics Concern    Not on file   Social History Narrative    Not on file     Social Determinants of Health     Financial Resource Strain: Not on file   Food Insecurity: Not on file   Transportation Needs: Not on file   Stress: Not on file   Housing Stability: Not on file       Allergies:   No Known Allergies    Current Medications:    Current Outpatient Medications:     ferrous sulfate 325 (65 FE) MG Oral Tab EC, Take 1 tablet (325 mg total) by mouth daily with breakfast., Disp: , Rfl:     FLUOXETINE 20 MG Oral Cap, TAKE 1 CAPSULE BY MOUTH EVERY DAY, Disp: 90 capsule, Rfl: 0    Vitamin B12 100 MCG Oral Tab, , Disp: , Rfl:     ondansetron 4 MG Oral Tablet Dispersible, DISSOLVE 1 TABLET IN MOUTH EVERY 6 HOURS AS NEEDED, Disp: , Rfl:     Psyllium (METAMUCIL 3 IN 1 DAILY FIBER OR), , Disp: , Rfl:     promethazine 25 MG Oral Tab, Take 1 tablet (25 mg total) by mouth every 6 (six) hours as needed for Nausea., Disp: 30 tablet, Rfl: 1    prenatal vitamin with DHA 27-0.8-228 MG Oral Cap, Take 1 capsule by mouth daily., Disp: , Rfl:     pyridoxine 100 MG Oral Tab, Take 1 tablet (100 mg total) by mouth daily., Disp: , Rfl:     Doxylamine Succinate, Sleep, (UNISOM OR), Take by mouth., Disp: , Rfl:     famotidine 20 MG Oral Tab, Take 1 tablet (20 mg total) by mouth daily., Disp: , Rfl:     Review of Systems:  A comprehensive 10-point ROS completed all negative unless otherwise documented in HPI.     Vital Signs:  /65 (BP Location: Left arm, Patient Position: Sitting, Cuff Size: adult)   Pulse 83   Temp 98.3 °F (36.8 °C) (Oral)   Resp 16   Ht 1.651 m (5' 5\")   Wt 73 kg (161 lb)   LMP 03/11/2024   SpO2 100%   BMI 26.79 kg/m²     Physical Examination:  General: Alert and oriented x 3, not in acute distress.  HEENT: Non-icteric sclera. Oropharynx is  clear.   Neck: No palpable lymphadenopathy. Neck is supple.  Chest: Clear to auscultation.  Heart: Regular rate and rhythm. S1 S2 normal.   Abdomen: Soft, non tender, non distended with good bowel sounds.  Extremities: Pedal pulses are present. No edema.  Neurological: Grossly intact.   Lymphatics: No palpable lymphadenopathy in the cervical, supraclavicular, axillary, or inguinal regions.  Psych/Depression: Appropriate mood and affect.     Laboratory:    Lab Results   Component Value Date    WBC 9.7 07/16/2024    RBC 4.25 07/16/2024    HGB 11.8 (L) 07/16/2024    HCT 36.8 07/16/2024    MCV 86.6 07/16/2024    MCH 27.8 07/16/2024    MCHC 32.1 07/16/2024    RDW 13.2 07/16/2024    .0 07/16/2024     Lab Results   Component Value Date     05/15/2024    K 4.2 05/15/2024     05/15/2024    CO2 23.0 05/15/2024    BUN 8 (L) 05/15/2024    CREATSERUM 0.80 05/15/2024    GLU 87 05/15/2024    CA 9.2 05/15/2024    ALKPHO 20 (L) 05/15/2024    ALT 17 05/15/2024    AST 10 (L) 05/15/2024    BILT 0.3 05/15/2024    ALB 3.5 05/15/2024    TP 7.4 05/15/2024       Radiology:  US OB COMPLETE 2ND TRIMESTER >14 WKS EMG ONLY [id number]    Result Date: 7/30/2024  ANATOMY SCAN GESTATIONAL AGE 20 WEEKS 1 DAY SINGLE VIABLE IUP SEEN FHT =148 BPM EFW =327 GRMS (MEASURING 19 WEEKS 6 DAYS_ PRESENTATION - BREECH AMNIOTIC FLUID - NORMAL ANATOMY - LIMITED VIEWS OF KIDNEYS AND LOWER SPINE DUE TO SPINE DOWN/BREECH FETAL POSITION CERVIX CLOSED. NO PREVIA IMPRESSION NEEDS FOLLOW UP IN 2 WEEKS FOR KIDNEYS AND LOWER SPINE    US AXILLARY BILATERAL (HOQ=57738)    Result Date: 7/22/2024  CONCLUSION:  1. Findings are most consistent with bilateral axillary lymph nodes as detailed above.  Correlate clinically.   LOCATION:  [facility]   Dictated by (CST): Camila Guerrero MD on 7/22/2024 at 1:58 PM     Finalized by (CST): Camila Guerrero MD on 7/22/2024 at 2:10 PM         Pathology:       Cancer Staging   No matching staging information was found for the patient.        Impression:     Rhona Medellin      Plan:  ***    Emotional Well Being:  This was assessed and there are currently no active issues with regard to maintaining the patient's emotional equilibrium.  The resources of [facility] were pointed out and made available and the patient and family are encouraged to utilize them when needed.    *** minutes spent on this encounter, more than 50% of that time was spent counseling the patient and/or on coordination of care.  The diagnosis, prognosis, and general treatment was explained to the patient and the family. All pertinent questions answered to their satisfaction. Emotional support provided.       Thank you for the consultation request and the opportunity to participate in the care of Melissa Rosa. We will continue to follow and provide further recommendations. Please contact me for any questions or concerns.         Dotty Connolly MD   Hematology Oncology

## 2024-08-07 ENCOUNTER — ULTRASOUND ENCOUNTER (OUTPATIENT)
Facility: CLINIC | Age: 29
End: 2024-08-07
Payer: COMMERCIAL

## 2024-08-21 ENCOUNTER — ROUTINE PRENATAL (OUTPATIENT)
Facility: CLINIC | Age: 29
End: 2024-08-21
Payer: COMMERCIAL

## 2024-08-21 VITALS
BODY MASS INDEX: 27.99 KG/M2 | DIASTOLIC BLOOD PRESSURE: 62 MMHG | HEART RATE: 87 BPM | HEIGHT: 65 IN | WEIGHT: 168 LBS | SYSTOLIC BLOOD PRESSURE: 102 MMHG

## 2024-08-21 DIAGNOSIS — Z3A.23 23 WEEKS GESTATION OF PREGNANCY (HCC): ICD-10-CM

## 2024-08-21 DIAGNOSIS — Z34.02 ENCOUNTER FOR SUPERVISION OF NORMAL FIRST PREGNANCY IN SECOND TRIMESTER (HCC): Primary | ICD-10-CM

## 2024-08-21 NOTE — PROGRESS NOTES
23w2d - visit # 3    Patient has no complaints    -20 wk US NL    ADRI 24 by LMP c/w 6w5d US & 8w5d US  ED RN at [facility]  -Aneuploidy screening options discussed        NIPS - neg BOY         Carrier neg             GERD  -famotidine per GI - doing ok     Constipation  -24 ED visit at approx 7 wk gestation - c/o LLQ pain & constipation x 2 weeks. Vomited x 2. [facility] - Ultrasound shows live IUP. Labs are unremarkable. Patient was given enema x 1 and had relief of symptoms after 2 bowel movements.   -taking colace and fiber supplements daily      Depression & anxiety   -meds - IS  currently taking fluoxetine 20 mg daily - Rx per PCP. - she resumed it, because she was feeling more irritable. Is feeling better   - abstinence syndrome reviewed  -Risks benefits of continuing SSRI during pregnancy & postpartum reviewed.   -therapist - in the past.   - referral - declines       Iron deficiency  -5/15: ferritin 25, B12 nl, HgB 12.5 - taking additional PO B12        BMI 25 Overweight   -Wt gain goal 15-25 lb discussed.

## 2024-09-05 ENCOUNTER — LAB ENCOUNTER (OUTPATIENT)
Dept: LAB | Facility: HOSPITAL | Age: 29
End: 2024-09-05
Attending: OBSTETRICS & GYNECOLOGY
Payer: COMMERCIAL

## 2024-09-05 DIAGNOSIS — Z34.02 ENCOUNTER FOR SUPERVISION OF NORMAL FIRST PREGNANCY IN SECOND TRIMESTER (HCC): ICD-10-CM

## 2024-09-05 LAB
BASOPHILS # BLD AUTO: 0.03 X10(3) UL (ref 0–0.2)
BASOPHILS NFR BLD AUTO: 0.3 %
EOSINOPHIL # BLD AUTO: 0.13 X10(3) UL (ref 0–0.7)
EOSINOPHIL NFR BLD AUTO: 1.4 %
ERYTHROCYTE [DISTWIDTH] IN BLOOD BY AUTOMATED COUNT: 13.3 %
GLUCOSE 1H P GLC SERPL-MCNC: 123 MG/DL
HCT VFR BLD AUTO: 35.8 %
HGB BLD-MCNC: 11.7 G/DL
IMM GRANULOCYTES # BLD AUTO: 0.07 X10(3) UL (ref 0–1)
IMM GRANULOCYTES NFR BLD: 0.8 %
LYMPHOCYTES # BLD AUTO: 1.73 X10(3) UL (ref 1–4)
LYMPHOCYTES NFR BLD AUTO: 18.9 %
MCH RBC QN AUTO: 28.1 PG (ref 26–34)
MCHC RBC AUTO-ENTMCNC: 32.7 G/DL (ref 31–37)
MCV RBC AUTO: 85.9 FL
MONOCYTES # BLD AUTO: 0.51 X10(3) UL (ref 0.1–1)
MONOCYTES NFR BLD AUTO: 5.6 %
NEUTROPHILS # BLD AUTO: 6.67 X10 (3) UL (ref 1.5–7.7)
NEUTROPHILS # BLD AUTO: 6.67 X10(3) UL (ref 1.5–7.7)
NEUTROPHILS NFR BLD AUTO: 73 %
PLATELET # BLD AUTO: 220 10(3)UL (ref 150–450)
RBC # BLD AUTO: 4.17 X10(6)UL
WBC # BLD AUTO: 9.1 X10(3) UL (ref 4–11)

## 2024-09-05 PROCEDURE — 82950 GLUCOSE TEST: CPT

## 2024-09-05 PROCEDURE — 36415 COLL VENOUS BLD VENIPUNCTURE: CPT

## 2024-09-05 PROCEDURE — 85025 COMPLETE CBC W/AUTO DIFF WBC: CPT

## 2024-09-19 ENCOUNTER — ROUTINE PRENATAL (OUTPATIENT)
Facility: CLINIC | Age: 29
End: 2024-09-19
Payer: COMMERCIAL

## 2024-09-19 VITALS
DIASTOLIC BLOOD PRESSURE: 60 MMHG | HEIGHT: 65 IN | BODY MASS INDEX: 28.32 KG/M2 | SYSTOLIC BLOOD PRESSURE: 108 MMHG | HEART RATE: 94 BPM | WEIGHT: 170 LBS

## 2024-09-19 DIAGNOSIS — Z3A.27 27 WEEKS GESTATION OF PREGNANCY (HCC): ICD-10-CM

## 2024-09-19 DIAGNOSIS — Z34.02 ENCOUNTER FOR SUPERVISION OF NORMAL FIRST PREGNANCY IN SECOND TRIMESTER (HCC): Primary | ICD-10-CM

## 2024-09-19 NOTE — PROGRESS NOTES
Patient has no complaints  - HIV, T pal ordered, TDAP next visit    -20 wk US NL    ADRI 24 by LMP c/w 6w5d US & 8w5d US  ED RN at [facility]  -Aneuploidy screening options discussed        NIPS - neg BOY         Carrier neg             GERD  -famotidine per GI - doing ok     Constipation  -24 ED visit at approx 7 wk gestation - c/o LLQ pain & constipation x 2 weeks. Vomited x 2. [facility] Emergency Department - Ultrasound shows live IUP. Labs are unremarkable. Patient was given enema x 1 and had relief of symptoms after 2 bowel movements.   -taking colace and fiber supplements daily      Depression & anxiety   -meds - IS  currently taking fluoxetine 20 mg daily - Rx per PCP. - she resumed it, because she was feeling more irritable. Is feeling better   - abstinence syndrome reviewed  -Risks benefits of continuing SSRI during pregnancy & postpartum reviewed.   -therapist - in the past.   - referral - declines       Iron deficiency  -5/15: ferritin 25, B12 nl, HgB 12.5 - taking additional PO B12        BMI 25 Overweight   -Wt gain goal 15-25 lb discussed.

## 2024-10-07 NOTE — PROGRESS NOTES
Patient has no complaints  - HIV, T pal done, TDAP and flu vaccine today    -20 wk US NL    ADRI 24 by LMP c/w 6w5d US & 8w5d US  ED RN at [facility]  -Aneuploidy screening options discussed        NIPS - neg BOY         Carrier neg             GERD  -famotidine per GI - doing ok     Constipation  -24 ED visit at approx 7 wk gestation - c/o LLQ pain & constipation x 2 weeks. Vomited x 2. [facility] Emergency Department - Ultrasound shows live IUP. Labs are unremarkable. Patient was given enema x 1 and had relief of symptoms after 2 bowel movements.   -taking colace and fiber supplements daily      Depression & anxiety   -meds - IS  currently taking fluoxetine 20 mg daily - Rx per PCP. - she resumed it, because she was feeling more irritable. Is feeling better   - abstinence syndrome reviewed  -Risks benefits of continuing SSRI during pregnancy & postpartum reviewed.   -therapist - in the past.   - referral - declines       Iron deficiency  -5/15: ferritin 25, B12 nl, HgB 12.5 - taking additional PO B12        BMI 25 Overweight   -Wt gain goal 15-25 lb discussed.

## 2024-10-09 NOTE — TELEPHONE ENCOUNTER
[employer] fmla forms received and logged for processing, sent [employer] for karthik and fcr

## 2024-10-11 NOTE — TELEPHONE ENCOUNTER
Dr. Hartman,    Please sign off on form if you agree to: Family Medical Leave Act maternity leave    -Signature page will be the first page scanned  -From your Inbasket, Highlight the patient and click Chart   -Double click the10/8/24 Forms Completion telephone encounter  -Scroll down to the Media section   -Click the blue Hyperlink: Family Medical Leave Act, Clara Hartman, 10/11/24    -Click Acknowledge located in the top right ribbon/menu   -Drag the mouse into the blank space of the document and a + sign will appear. Left click to   electronically sign the document.  -Once signed, simply exit out of the screen and you signature will be saved.     Thank you,  Zoraida DUFF

## 2024-10-19 NOTE — PROGRESS NOTES
ALBERTO 31.5     +FM, no LOF, no VB, no Ctx/cramping  - started making colustrum at 28 weeks         ADRI 24 by LMP c/w 6w5d US & 8w5d US  ED RN at [facility]  -Aneuploidy screening options discussed        NIPS - neg BOY         Carrier neg      -   nl anatomy   - 3rd t labs done and nl  - Tdap and flu done  -Rsv at next    GERD  -Tums and pepcid w improvement      Depression & anxiety   -meds - IS  currently taking fluoxetine 20 mg daily - Rx per PCP. - she resumed it, because she was feeling more irritable. Is feeling better   - abstinence syndrome reviewed  -Risks benefits of continuing SSRI during pregnancy & postpartum reviewed.   -therapist - in the past.   - referral - declines       Iron deficiency  -5/15: ferritin 25, B12 nl, HgB 12.5 - taking additional PO B12  -24 HgB 11.7     BMI 25 Overweight   -Wt gain goal 15-25 lb discussed.

## 2024-11-01 NOTE — PROGRESS NOTES
ALBERTO 33.4     No regular Ucx, VB, LOF. Does endorse decreased FM that started last night into today. Has an hour long drive and did not feel any movement. Usually feels baby kicking in her ribs but has not felt that. Placed on NST.      ADRI 24 by LMP c/w 6w5d US & 8w5d US  ED RN at [facility]  -Aneuploidy screening options discussed        NIPS - neg BOY         Carrier neg      -   nl anatomy   - 3rd t labs done and nl  - Tdap and flu done  -Rsv at next    GERD  -Tums and pepcid w improvement      Depression & anxiety   -meds - IS  currently taking fluoxetine 20 mg daily - Rx per PCP. - she resumed it, because she was feeling more irritable. Is feeling better   - abstinence syndrome reviewed  -Risks benefits of continuing SSRI during pregnancy & postpartum reviewed.   -therapist - in the past.   - referral - declines       Iron deficiency  -5/15: ferritin 25, B12 nl, HgB 12.5 - taking additional PO B12  -24 HgB 11.7     BMI 25 Overweight   -Wt gain goal 15-25 lb discussed.     RTC 2 weeks    NST non-reactive in office today, pt with c/o decreased FM. Sent to L&D triage for BPP and further assessment

## 2024-11-01 NOTE — PROGRESS NOTES
EFMs removed, FHTs 140. Discharge instructions reviewed with pt. Pt verbalizes understanding. Denies questions. Pt changing at this time.

## 2024-11-01 NOTE — PROGRESS NOTES
, 33+4 arrives per ambulation. Pt sent over from office for monitoring and BPP. Pt taken to Triage 4 and changing at this time.

## 2024-11-01 NOTE — NST
Nonstress Test   Patient: Melissa Soler    Gestation: 33w4d    NST:       Variability: Moderate           Accelerations: Yes           Decelerations: None            Baseline: 145 BPM           Uterine Irritability: No           Contractions: Irregular                    Contraction Frequency: 1.5-3                   Acoustic Stimulator: No           Nonstress Test Interpretation: Reactive           Nonstress Test Second Interpretation: Reactive                     Additional Comments

## 2024-11-13 NOTE — PROGRESS NOTES
Pedro 35w2d    She is doing well, no complaints. Baby active   -would like RSV - did not have any in the office today, will give at next OB visit.       ADRI 24 by LMP c/w 6w5d US & 8w5d US  ED RN at [facility]  -Aneuploidy screening options discussed        NIPS - neg BOY         Carrier neg      -   nl anatomy   - 3rd HIV & T pal done   - Tdap and flu done       GERD  -Tums and pepcid w improvement      Depression & anxiety   -meds - IS  currently taking fluoxetine 20 mg daily - Rx per PCP. - she resumed it, because she was feeling more irritable. Is feeling better   - abstinence syndrome reviewed  -Risks benefits of continuing SSRI during pregnancy & postpartum reviewed.   -therapist - in the past.   - referral - declines    -managing well      Iron deficiency  -5/15: ferritin 25, B12 nl, HgB 12.5 - taking additional PO B12  -24 HgB 11.7     BMI 25 Overweight   -Wt gain goal 15-25 lb discussed.      Rtc 1 wk for GBS & RSV

## 2024-11-18 ENCOUNTER — TELEPHONE (OUTPATIENT)
Dept: URGENT CARE | Age: 29
End: 2024-11-18

## 2024-11-18 ENCOUNTER — APPOINTMENT (OUTPATIENT)
Dept: URGENT CARE | Age: 29
End: 2024-11-18

## 2024-11-20 NOTE — PROGRESS NOTES
Pedro 36w3d    She is doing well, no complaints. Baby active   RSV today.   GBS done.   SVE: cl/50/-3 --> vertex     ADRI 24 by LMP c/w 6w5d US & 8w5d US  ED RN at [facility]  -Aneuploidy screening options discussed        NIPS - neg BOY         Carrier neg      -   nl anatomy   - 3rd HIV & T pal done   - Tdap and flu done  - GBS done       GERD  -Tums and pepcid w improvement      Depression & anxiety   -meds - IS  currently taking fluoxetine 20 mg daily - Rx per PCP. - she resumed it, because she was feeling more irritable. Is feeling better   - abstinence syndrome reviewed  -Risks benefits of continuing SSRI during pregnancy & postpartum reviewed.   -therapist - in the past.   - referral - declines    -managing well      Iron deficiency  -5/15: ferritin 25, B12 nl, HgB 12.5 - taking additional PO B12  -24 HgB 11.7     BMI 25 Overweight   -Wt gain goal 15-25 lb discussed.      Rtc 1 wk

## 2024-11-22 NOTE — TELEPHONE ENCOUNTER
Last time medication was refilled 7/3/24  Last office visit  7/16/24  Next office visit due/scheduled   No future appointments    Medication not on protocol.    
Alert-The patient is alert, awake and responds to voice. The patient is oriented to time, place, and person. The triage nurse is able to obtain subjective information.

## 2024-11-25 NOTE — PROGRESS NOTES
Patient has no complaints  - labor instructions    RSV done      ADRI 24 by LMP c/w 6w5d US & 8w5d US  ED RN at [facility]  -Aneuploidy screening options discussed        NIPS - neg BOY         Carrier neg      -   nl anatomy   - 3rd HIV & T pal done   - Tdap and flu done  - GBS done       GERD  -Tums and pepcid w improvement      Depression & anxiety   -meds - IS  currently taking fluoxetine 20 mg daily - Rx per PCP. - she resumed it, because she was feeling more irritable. Is feeling better   - abstinence syndrome reviewed  -Risks benefits of continuing SSRI during pregnancy & postpartum reviewed.   -therapist - in the past.   - referral - declines    -managing well      Iron deficiency  -5/15: ferritin 25, B12 nl, HgB 12.5 - taking additional PO B12  -24 HgB 11.7     BMI 25 Overweight   -Wt gain goal 15-25 lb discussed.

## 2024-12-02 NOTE — DISCHARGE INSTRUCTIONS
Discharge Instructions    Diet: regular diet/push fluids  Activity: Normal activity         General Instructions    Call your OB doctor if: Fluid leaking from your vagina;Uterine contractions increasing in intensity and frequency;Vaginal bleeding;Vaginal or rectal pressure;Uterine contractions 10 minutes or closer for 1 to 2 hours;Decrease in fetal movement;Temperature greater than 100F  Early labor comfort measures: Drink fluids and eat small light meals;Relax, sleep, take a warm bath or shower for 30 minutes or less;Take a walk

## 2024-12-02 NOTE — NST
Nonstress Test   Patient: Melissa Soler    Gestation: 37w6d    NST:       Variability: Moderate           Accelerations: Yes           Decelerations: None            Baseline: 135 BPM           Uterine Irritability: No           Contractions: Irregular                                        Acoustic Stimulator: No           Nonstress Test Interpretation: Reactive           Nonstress Test Second Interpretation: Reactive                     Additional Comments

## 2024-12-02 NOTE — PROGRESS NOTES
Written and verbal discharge instructions given to patient and spouse, questions answered and verbalized understanding of teaching. Patient discharged ambulatory, with all belongings, accompanied by spouse, undelivered, not in active labor. Patient to keep next ALBERTO appointment on December 4th.

## 2024-12-02 NOTE — PROGRESS NOTES
Pt is a 29 year old female admitted to TRG5/TRG5-A.     Chief Complaint   Patient presents with    R/o Rom     Patient states she had a bowel movement and felt a small gush after using the restroom and since then has felt more wet. She denies any large gushes of fluid, bright red vaginal bleeding. She states she has felt some more uncomfortable irregular contractions while on the way to the hospital. +FM.       Pt is  37w6d intra-uterine pregnancy.  History obtained. Oriented to room, staff, and plan of care.

## 2024-12-02 NOTE — PROGRESS NOTES
On-call MD    Patient thinks she may have broken her water.  She was having a bowel movement at work but then since then noticed her underwear has been more moist to the point of saturating through her clothing. No contractions     Patient advised to present to labor and delivery for exam to evaluate for PROM

## 2024-12-04 NOTE — PROGRESS NOTES
Pedro 38w2d    She is doing well, no complaints   Labor instructions given   Sve /-2 cephalic -attempted membrane sweep per pt request     ADRI 24 by LMP c/w 6w5d US & 8w5d US  ED RN at [facility]  -Aneuploidy screening options discussed        NIPS - neg BOY         Carrier neg      -   nl anatomy   - 3rd HIV & T pal done   - Tdap and flu done  - GBS done   -rsv done 24     GERD  -Tums and pepcid w improvement      Depression & anxiety   -meds - IS  currently taking fluoxetine 20 mg daily - Rx per PCP. - she resumed it, because she was feeling more irritable. Is feeling better   - abstinence syndrome reviewed  -Risks benefits of continuing SSRI during pregnancy & postpartum reviewed.   -therapist - in the past.   - referral - declines    -managing well      Iron deficiency  -5/15: ferritin 25, B12 nl, HgB 12.5 - taking additional PO B12  -24 HgB 11.7     BMI 25 Overweight   -Wt gain goal 15-25 lb discussed.

## 2024-12-09 NOTE — PROGRESS NOTES
Patient has no complaints, desires IOL if no labor - will send request    ADRI 24 by LMP c/w 6w5d US & 8w5d US  ED RN at [facility]  -Aneuploidy screening options discussed        NIPS - neg BOY         Carrier neg      -   nl anatomy   - 3rd HIV & T pal done   - Tdap and flu done  - GBS done   -rsv done 24     GERD  -Tums and pepcid w improvement      Depression & anxiety   -meds - IS  currently taking fluoxetine 20 mg daily - Rx per PCP. - she resumed it, because she was feeling more irritable. Is feeling better   - abstinence syndrome reviewed  -Risks benefits of continuing SSRI during pregnancy & postpartum reviewed.   -therapist - in the past.   - referral - declines    -managing well      Iron deficiency  -5/15: ferritin 25, B12 nl, HgB 12.5 - taking additional PO B12  -24 HgB 11.7     BMI 25 Overweight   -Wt gain goal 15-25 lb discussed.

## 2024-12-12 NOTE — TELEPHONE ENCOUNTER
Spoke with patient. She is having white to clear discharge and occasional tightening. Nothing time-able. Discharge not itchy, or have a smell. Instructed to put a pad on and monitor until seen today. Scheduled her to be seen today. Will call if symptoms progress where she may need to go to L&D. Understanding verbalized.

## 2024-12-12 NOTE — H&P
[facility]   part of [facility]    History & Physical    Melissa Soler Patient Status:  Inpatient    4/10/1995 MRN [id number]   Location [facility] LABOR & DELIVERY Attending Nara Bey,*   Hosp Day # 0 PCP Chase Kim MD     Date of Admission:  2024    HPI:   Melissa Soler is a 29 year old  at 39w3d presenting for IOL for non-reassuring  testing    Drop in fetal heart rate on doppler in clinic today.  NST in triage with 5 minute deceleration 10 minutes below the baseline.  Moderate variability throughout - overall reassuring.  Will move forward with delivery.      +FM, no LOF, no VB, increased vaginal mucous.    Her current obstetrical history is significant for:  GERD  Depression/anxiety  Iron deficiency  Overweight         History   Obstetric History:   OB History    Para Term  AB Living   1 0 0 0 0 0   SAB IAB Ectopic Multiple Live Births   0 0 0 0 0      # Outcome Date GA Lbr Naveen/2nd Weight Sex Type Anes PTL Lv   1 Current              Past Medical History:   Past Medical History:    Acne vulgaris    took spironolactone & OCP Kamala    Allergic rhinitis, seasonal    Anemia    Anxiety    as of 10/2021 x 6 months - Onset was approximately 6 months ago, unchanged since that time.she reports she had a break-up in April, and has not been able to recover from it, Counseling, fluoxetine.    Anxiety disorder    Chickenpox    Chronic gastritis    Closed fracture of elbow    Constipation    24 ED visit at approx 7 wk gestation - c/o LLQ pain & constipation x 2 weeks. Vomited x 2. [facility] Emergency Department - Ultrasound shows live IUP. Labs are unremarkable. Patient was given enema x 1 and had relief of symptoms after 2 bowel movements. US 24 with SLIUP measuring 6w5d    COVID-19    Decorative tattoo    Depression    as of 10/2021 x 6 months -  Onset was approximately 6 months ago, unchanged since that  time.she reports she had a break-up in April, and has not been able to recover from it, Counseling, fluoxetine.    Eczema    Encopresis    Fatigue    GERD (gastroesophageal reflux disease)    2021 - EGD w mild esophagitis.    Hx of motion sickness    Low vitamin B12 level    Vit B12 197    Myalgia    Nausea    Needlestick injury accident with exposure to body fluid    Pap smear for cervical cancer screening    Pap negative - Olga Chambers APRN, CNP - [facility]. CareEverywhere    PONV (postoperative nausea and vomiting)    SEVERE PONV    Recurrent herpetic carla    vs dyshidrotic eczema    Screening for genetic disease carrier status    [employer] Carrier Screen = Negative    UTI due to Klebsiella species    Walk in clinic. Klebsiella penumonia UTI 10-50k - resistant to bactrim, intermediate to nitrofurantoin, susceptible to augmentin & cefazolin    Visual impairment    CONTACTS/GLASSES    Wears glasses    Well woman exam    4/12/22 - Olga Chambers APRN, CNP - [facility] - Annual Well Woman Exam. Last pap 2021. No h/o abn pap. Started spironolactone for acne. Rx Kamala.     Past Social History:   Past Surgical History:   Procedure Laterality Date    Egd  08/31/2021    EGD w mild esophagitis & reactive gastropathy - Jan Diaz MD [facility]    Hand surgery Right 2013    fracture - 4 screws during cheerleading    Laparoscopic appendectomy      in 5th grade. Not ruptured    Tonsillectomy  01/23/2024    chronic tonsillitis Pradeep Elizalde MD     Family History:   Family History   Problem Relation Age of Onset    No Known Problems Mother     Hypertension Father     Other (peptic ulcer) Father     No Known Problems Brother     No Known Problems Brother     Thyroid disease Maternal Grandmother     Cancer Paternal Grandfather         prostate cancer    Prostate Cancer Paternal Grandfather     Birth Defects Neg     Genetic Disease Neg     Clotting Disorder Neg      DVT/VTE Neg     Breast Cancer Neg     Ovarian Cancer Neg     Colon Cancer Neg     Diabetes Neg     Infertility Neg      Social History:   Social History     Tobacco Use    Smoking status: Never    Smokeless tobacco: Never   Substance Use Topics    Alcohol use: Not Currently        Allergies/Medications:   Allergies:   Allergies[1]  Medications:  Prescriptions Prior to Admission[2]    Review of Systems:   As documented in HPI, otherwise negative    Physical Exam:   Temp:  [98 °F (36.7 °C)-98.2 °F (36.8 °C)] 98 °F (36.7 °C)  Pulse:  [64-91] 64  Resp:  [16-18] 16  BP: (114-133)/(54-78) 114/54    Constitutional: well appearing  Respiratory: no increased WOB, not using accessory muscles  Cardiac: regular rate  Abdomen: nontender  CEFM: 145/moderate/+Accels/1x prolonged decel x5 minutes 10 beats below the baseline  SVE: /-2    Results:     Lab Results   Component Value Date    TREPONEMALAB Nonreactive 2024    ABO AB 2024    RH Positive 2024    WBC 11.8 (H) 2024    HGB 12.0 2024    HCT 36.1 2024    .0 2024    CREATSERUM 0.80 05/15/2024    BUN 8 (L) 05/15/2024     05/15/2024    K 4.2 05/15/2024     05/15/2024    CO2 23.0 05/15/2024    GLU 87 05/15/2024    CA 9.2 05/15/2024    ALB 3.5 05/15/2024    ALKPHO 20 (L) 05/15/2024    BILT 0.3 05/15/2024    TP 7.4 05/15/2024    AST 10 (L) 05/15/2024    ALT 17 05/15/2024    TSH 1.606 2024       Lab Results   Component Value Date    B12 371 05/15/2024    COLORUR Colorless (A) 2024    CLARITY Clear 2024    SPECGRAVITY <1.005 (L) 2024    PROUR Negative 2024    GLUUR Normal 2024    KETUR Negative 2024    BILUR Negative 2024    BLOODURINE Negative 2024    NITRITE Negative 2024    UROBILINOGEN Normal 2024    LEUUR Negative 2024       Assessment/Plan:   Melissa Soler is a 29 year old  at 39w3d presenting for induction of labor for  non-reassuring  testing.    #IOL  - Admit SVE: /-2, intact  - Pelvis not proven.  Determined to be adequate  - OB hemorrhage risk: med.  Starting HgB: 12  - Plan pitocin and then AROM  - Plan epidural for pain management    #Fetal well being/NRAT  - audible deceleration in clinic and then 5 minute prolonged deceleration in triage 10 beats below the baseline.  Overall reassuring with moderate variability - even during the deceleration.  No longer with decelerations.  Given she is term, decided to move forward with delivery.  Passed contraction stress test.  - CEFM: cat1  - Presentation: Vertex on Leopolds    # GBS status: negative      Nara Bey MD  2024  5:30 PM    Note to patient and family   The 21st Century Cures Act makes medical notes available to patients in the interest of transparency.  However, please be advised that this is a medical document.  It is intended as [id number]-xypr communication.  It is written and medical language may contain abbreviations or verbiage that are technical and unfamiliar.  It may appear blunt or direct.  Medical documents are intended to carry relevant information, facts as evident, and the clinical opinion of the practitioner.            [1] No Known Allergies  [2]   Medications Prior to Admission   Medication Sig Dispense Refill Last Dose/Taking    FLUoxetine 20 MG Oral Cap Take 1 capsule (20 mg total) by mouth daily. 90 capsule 0 2024    ferrous sulfate 325 (65 FE) MG Oral Tab EC Take 1 tablet (325 mg total) by mouth daily with breakfast.   2024    Vitamin B12 100 MCG Oral Tab    2024    promethazine 25 MG Oral Tab Take 1 tablet (25 mg total) by mouth every 6 (six) hours as needed for Nausea. 30 tablet 1 2024    prenatal vitamin with DHA 27-0.8-228 MG Oral Cap Take 1 capsule by mouth daily.   2024    Doxylamine Succinate, Sleep, (UNISOM OR) Take by mouth.   2024    famotidine 20 MG Oral Tab Take 1 tablet (20 mg  total) by mouth 2 (two) times daily.   12/12/2024    L-Methylfolate-Algae-B12-B6 (METANX) 3-90.314-2-35 MG Oral Cap Take 1 tablet by mouth daily. 90 capsule 3     loratadine 10 MG Oral Tab Take 1 tablet (10 mg total) by mouth 2 (two) times daily.       Psyllium (METAMUCIL 3 IN 1 DAILY FIBER OR)        pyridoxine 100 MG Oral Tab Take 1 tablet (100 mg total) by mouth daily.

## 2024-12-12 NOTE — PROGRESS NOTES
ALBERTO 39.3     Audible deceleration to the 90s heard for 15 seconds.  Then returned to baseline of 115.  I offered patient IOL versus 1 hour long NST on labor and delivery.  Patient opted for latter.  Will keep her on for 1 hour.      IOL scheduled for 2024    ADRI 24 by LMP c/w 6w5d US & 8w5d US  ED RN at [facility]  -Aneuploidy screening options discussed        NIPS - neg BOY         Carrier neg      -   nl anatomy   - 3rd HIV & T pal done   - Tdap and flu done  - GBS done   -rsv done 24     GERD  -Tums and pepcid w improvement      Depression & anxiety   -meds - IS  currently taking fluoxetine 20 mg daily - Rx per PCP. - she resumed it, because she was feeling more irritable. Is feeling better   - abstinence syndrome reviewed  -Risks benefits of continuing SSRI during pregnancy & postpartum reviewed.   -therapist - in the past.   - referral - declines    -managing well      Iron deficiency  -5/15: ferritin 25, B12 nl, HgB 12.5 - taking additional PO B12  -24 HgB 11.7     BMI 25 Overweight   -Wt gain goal 15-25 lb discussed.

## 2024-12-12 NOTE — PROGRESS NOTES
Pt admitted for IOL, transferred to  106 via ambulatory w/ RN and spouse @ side. Pt oriented to room, POC discussed, all questions answered.

## 2024-12-12 NOTE — PLAN OF CARE
Problem: BIRTH - VAGINAL/ SECTION  Goal: Fetal and maternal status remain reassuring during the birth process  Description: INTERVENTIONS:  - Monitor vital signs  - Monitor fetal heart rate  - Monitor uterine activity  - Monitor labor progression (vaginal delivery)  - DVT prophylaxis (C/S delivery)  - Surgical antibiotic prophylaxis (C/S delivery)  Outcome: Progressing     Problem: PAIN - ADULT  Goal: Verbalizes/displays adequate comfort level or patient's stated pain goal  Description: INTERVENTIONS:  - Encourage pt to monitor pain and request assistance  - Assess pain using appropriate pain scale  - Administer analgesics based on type and severity of pain and evaluate response  - Implement non-pharmacological measures as appropriate and evaluate response  - Consider cultural and social influences on pain and pain management  - Manage/alleviate anxiety  - Utilize distraction and/or relaxation techniques  - Monitor for opioid side effects  - Notify MD/LIP if interventions unsuccessful or patient reports new pain  - Anticipate increased pain with activity and pre-medicate as appropriate  Outcome: Progressing     Problem: ANXIETY  Goal: Will report anxiety at manageable levels  Description: INTERVENTIONS:  - Administer medication as ordered  - Teach and rehearse alternative coping skills  - Provide emotional support with 1:1 interaction with staff  Outcome: Progressing     Problem: Patient/Family Goals  Goal: Patient/Family Long Term Goal  Description: Patient's Long Term Goal: uncomplicated vaginal delivery    Interventions:  - See additional Care Plan goals for specific interventions  Outcome: Progressing  Goal: Patient/Family Short Term Goal  Description: Patient's Short Term Goal: effective pain and anxiety management    Interventions:   - See additional Care Plan goals for specific interventions  Outcome: Progressing

## 2024-12-12 NOTE — PROGRESS NOTES
Pt is a 29 year old female admitted to TRG3/TRG3-A.     Chief Complaint   Patient presents with    Non-stress Test      Pt is  39w3d intra-uterine pregnancy.  History obtained, consents signed. Oriented to room, staff, and plan of care.    Pt sent from office, per Dr. Lloyd, pt noted to have audible deceleration of FHR with doppler.  Pt here for prolonged monitoring.

## 2024-12-13 NOTE — ANESTHESIA PREPROCEDURE EVALUATION
PRE-OP EVALUATION    Patient Name: Melissa Soler    Admit Diagnosis: PREGNANCY  Pregnancy (HCC)    Pre-op Diagnosis: * No pre-op diagnosis entered *        Anesthesia Procedure: LABOR ANALGESIA    * No surgeons found in log *    Pre-op vitals reviewed.  Temp: 97.7 °F (36.5 °C)  Pulse: 60  Resp: 16  BP: 128/73  SpO2: 99 %  Body mass index is 34.28 kg/m².    Current medications reviewed.  Hospital Medications:   ePHEDrine (PF) 25 MG/5 ML injection        lactated ringers infusion   Intravenous Continuous    dextrose in lactated ringers 5% infusion   Intravenous PRN    lactated ringers IV bolus 500 mL  500 mL Intravenous PRN    acetaminophen (Tylenol Extra Strength) tab 500 mg  500 mg Oral Q6H PRN    acetaminophen (Tylenol Extra Strength) tab 1,000 mg  1,000 mg Oral Q6H PRN    ibuprofen (Motrin) tab 600 mg  600 mg Oral Once PRN    ondansetron (Zofran) 4 MG/2ML injection 4 mg  4 mg Intravenous Q6H PRN    oxyTOCIN in sodium chloride 0.9% (Pitocin) 30 Units/500mL infusion premix  62.5-900 clare-units/min Intravenous Continuous    terbutaline (Brethine) 1 MG/ML injection 0.25 mg  0.25 mg Subcutaneous PRN    sodium citrate-citric acid (Bicitra) 500-334 MG/5ML oral solution 30 mL  30 mL Oral PRN    calcium carbonate (Tums) chewable tab 1,000 mg  1,000 mg Oral Q4H PRN    oxyTOCIN in sodium chloride 0.9% (Pitocin) 30 Units/500mL infusion premix  0.5-20 clare-units/min Intravenous Continuous    lactated ringers IV bolus 1,000 mL  1,000 mL Intravenous Once    fentaNYL-bupivacaine 2 mcg/mL-0.125% in sodium chloride 0.9% 100 mL EPIDURAL infusion premix  12 mL/hr Epidural Continuous    fentaNYL (Sublimaze) 50 mcg/mL injection 100 mcg  100 mcg Epidural Once    lidocaine 1.5%-EPINEPHrine 1:200,000 (Xylocaine-Epinephrine) injection  5 mL Injection PRN    bupivacaine PF (Marcaine) 0.25% injection  30 mL Injection PRN    lidocaine PF (Xylocaine-MPF) 2% injection  5 mL Injection PRN    sodium chloride 0.9% PF injection 10 mL  10  mL Injection PRN    ePHEDrine (PF) 25 MG/5 ML injection 5 mg  5 mg Intravenous PRN    nalbuphine (Nubain) 10 mg/mL injection 2.5 mg  2.5 mg Intravenous Q15 Min PRN    fentaNYL-bupivacaine in sodium chloride 0.9% 2 mcg/mL-0.125% EPIDURAL infusion premix        sodium chloride 0.9% PF 0.9% injection        fentaNYL (Sublimaze) 50 mcg/mL injection           Outpatient Medications:   Prescriptions Prior to Admission[1]    Allergies: Patient has no known allergies.      Anesthesia Evaluation        Anesthetic Complications  (+) history of anesthetic complications         GI/Hepatic/Renal      (+) GERD                           Cardiovascular                (+) obesity                                       Endo/Other                                  Pulmonary                           Neuro/Psych      (+) depression  (+) anxiety                              Past Surgical History:   Procedure Laterality Date    Egd  08/31/2021    EGD w mild esophagitis & reactive gastropathy - Jan Diaz MD [facility]    Hand surgery Right 2013    fracture - 4 screws during cheerleading    Laparoscopic appendectomy      in 5th grade. Not ruptured    Tonsillectomy  01/23/2024    chronic tonsillitis Pradeep Elizalde MD     Social History     Socioeconomic History    Marital status:    Tobacco Use    Smoking status: Never    Smokeless tobacco: Never   Vaping Use    Vaping status: Never Used   Substance and Sexual Activity    Alcohol use: Not Currently    Drug use: Never     History   Drug Use Unknown     Available pre-op labs reviewed.  Lab Results   Component Value Date    WBC 11.8 (H) 12/12/2024    RBC 4.25 12/12/2024    HGB 12.0 12/12/2024    HCT 36.1 12/12/2024    MCV 84.9 12/12/2024    MCH 28.2 12/12/2024    MCHC 33.2 12/12/2024    RDW 14.1 12/12/2024    .0 12/12/2024               Airway      Mallampati: II  Mouth opening: 3 FB  TM distance: 4 - 6 cm   Cardiovascular      Rhythm: regular  Rate: normal      Dental    Dentition appears grossly intact         Pulmonary      Breath sounds clear to auscultation bilaterally.               Other findings              ASA: 2   Plan: epidural  NPO status verified and patient meets guidelines.    Post-procedure pain management plan discussed with surgeon and patient.    Comment: Risks of labor epidural including headache, bleeding, infection, nerve injury, discussed with patient.  Patient understands risks and wishes to proceed.          Plan/risks discussed with: patient  Use of blood product(s) discussed with: patient    Consented to blood products.          Present on Admission:  **None**             [1]   Medications Prior to Admission   Medication Sig Dispense Refill Last Dose/Taking    FLUoxetine 20 MG Oral Cap Take 1 capsule (20 mg total) by mouth daily. 90 capsule 0 12/12/2024    ferrous sulfate 325 (65 FE) MG Oral Tab EC Take 1 tablet (325 mg total) by mouth daily with breakfast.   12/11/2024    Vitamin B12 100 MCG Oral Tab    12/11/2024    promethazine 25 MG Oral Tab Take 1 tablet (25 mg total) by mouth every 6 (six) hours as needed for Nausea. 30 tablet 1 12/11/2024    prenatal vitamin with DHA 27-0.8-228 MG Oral Cap Take 1 capsule by mouth daily.   12/11/2024    Doxylamine Succinate, Sleep, (UNISOM OR) Take by mouth.   12/11/2024    famotidine 20 MG Oral Tab Take 1 tablet (20 mg total) by mouth 2 (two) times daily.   12/12/2024    L-Methylfolate-Algae-B12-B6 (METANX) 3-90.314-2-35 MG Oral Cap Take 1 tablet by mouth daily. 90 capsule 3     loratadine 10 MG Oral Tab Take 1 tablet (10 mg total) by mouth 2 (two) times daily.       Psyllium (METAMUCIL 3 IN 1 DAILY FIBER OR)        pyridoxine 100 MG Oral Tab Take 1 tablet (100 mg total) by mouth daily.

## 2024-12-13 NOTE — ANESTHESIA PROCEDURE NOTES
Labor Analgesia    Date/Time: 12/13/2024 12:04 AM    Performed by: Yu Carnes MD  Authorized by: Yu Carnes MD      General Information and Staff    Start Time:  12/13/2024 12:04 AM  End Time:  12/13/2024 12:15 AM  Anesthesiologist:  Yu Carnes MD  Performed by:  Anesthesiologist  Patient Location:  OB  Site Identification: surface landmarks    Reason for Block: labor epidural    Preanesthetic Checklist: patient identified, IV checked, risks and benefits discussed, monitors and equipment checked, pre-op evaluation, timeout performed, IV bolus, anesthesia consent and sterile technique used      Procedure Details    Patient Position:  Sitting  Prep: ChloraPrep    Monitoring:  Heart rate and continuous pulse ox  Approach:  Midline    Epidural Needle    Injection Technique:  DONYA saline  Needle Type:  Tuohy  Needle Gauge:  17 G  Needle Length:  3.375 in  Needle Insertion Depth:  5    Spinal Needle      Catheter    Catheter Type:  End hole  Catheter Size:  19 G  Catheter at Skin Depth:  12  Test Dose:  Negative    Assessment    Sensory Level:  T10    Additional Comments

## 2024-12-13 NOTE — DISCHARGE INSTRUCTIONS
Hypertension related to pregnancy/postpartum    -Watch for symptoms of pre-eclampsia (severe or persistent headache not relieved with a dose of tylenol, visual disturbances, persistent or severe upper abdominal pain, shortness of breath, chest pain)  -Please obtain a blood pressure cuff for home from the list of [place] Blood Pressure Validated Device Listing- see www.validatebp.org  -Check blood pressure (and heart rate if you can) 2-3 times per day & more frequently if feeling poorly. Keep log & bring to visits.      If BP is 140/90 or higher (either number) you will likely be prescribed oral antihypertensive medication. Check blood pressure before a dose of medication. Can hold the medicine if you feel your blood pressure is getting too low (less than 110/70) or you are lightheaded.      If BP is 160/110 (either number) or higher, or you develop symptoms of pre-eclampsia, please immediately go to the emergency department at [facility] & let them know you may have pre-eclampsia. You will likely require IV antihypertensives and IV magnesium sulfate to prevent stroke and seizures.       Nothing in the vagina for 6 weeks   No strenuous activity/exercise  May shower immediately. May take bath  Keep wound(s) clean and dry. Wash daily with warm water & soap. Do not scrub. Gently pat dry. May cover with clean gauze or pad if needed.     AVOID CONSTIPATION:   -Take Miralax one capful in water or juice each morning.  You can also take each evening iif needed.  -Take Fiber supplement along with Miralax as well.  -May also take milk of magnesia or Dulcolax over the counter if needing to have a BM more urgently than Miralax is providing    -Do NOT strain for bowel movements    Please call office if:  -fever 100.4 or higher    Please proceed to the Emergency Department at [facility] for any of the following:   -vaginal bleeding soaking greater than 1 pad per hour  -severe pelvic pain  -shortness of breath  -chest  pain  -leg pain or swelling

## 2024-12-13 NOTE — DISCHARGE SUMMARY
[facility]   part of [facility]    Discharge Summary    Melissa Soler Patient Status:  Inpatient    4/10/1995 MRN [id number]   Location [facility] 2SW-J Attending Nara Bey,*   Hosp Day # 3 PCP Chase Kim MD     Date of Admission: 2024    Date of Discharge: 12/15/2024     Admission Diagnoses:   IUP at 39w3d  Audible deceleration while dopplering fetal heart tones at routine OB visit  Deceleration noted on NST on L&D  Induction of labor for non reassuring fetal status     Discharge Diagnosis:   Status post induction of labor  Status post normal spontaneous vaginal delivery at 39w4d   True knot in umbilical cord  Postpartum hemorrhage   Peripheral edema    Primary OB Clinician: Joao Hartman Mietelski, Keeney, Hrvojevic     Primary Children's Hospital Course:     29 year old  female at 39w3d was admitted for IOL for non-reassuring  testing. Drop in fetal heart rate on doppler in clinic today. NST in triage with 5 minute deceleration 10 minutes below the baseline. Moderate variability throughout - overall reassuring. Will move forward with delivery.     Pregnancy complicated by GERD, depression & anxiety on fluoxetine, iron deficiency, overweight, excessive weight gain 53 lb.     IV oxytocin. Amniotomy. Epidural. On 2024  male infant. APGARs 8/9, weight 8lb3oz. Body and shoulders easily delivered. Bulb suctioned. Umbilical cord clamped x2 and cut after 30 seconds. Placenta delivered spontaneously , intact. True knot noted on umbilical cord. 2nd degree perineal laceration repaired with 2-0 chromic. Good hemostasis.  ml     True knot in umbilical cord. Mild postpartum hemorrhage  mL.     Patient did well postpartum. Blood pressures remained normal to pre-hypertensive range. Patient asymptomatic for pre-eclampsia. Lasix & Kdur given for peripheral edema. Patient stable for discharge home on postpartum day #2.     Discharge Plan:   Discharge  Condition: Stable  Early Discharge:  NO    Discharge medications:     Discharge Medications        START taking these medications        Instructions Prescription details   furosemide 20 MG Tabs  Commonly known as: Lasix      Take 1 tablet (20 mg total) by mouth daily as needed (for swelling).   Quantity: 5 tablet  Refills: 0     potassium chloride 20 MEQ Tbcr  Commonly known as: Klor-Con M20      Take 1 tablet (20 mEq total) by mouth daily as needed (if needing to take furosemide (lasix) for swelling).   Quantity: 5 tablet  Refills: 0            CONTINUE taking these medications        Instructions Prescription details   famotidine 20 MG Tabs  Commonly known as: Pepcid      Take 1 tablet (20 mg total) by mouth 2 (two) times daily.   Refills: 0     ferrous sulfate 325 (65 FE) MG Tbec      Take 1 tablet (325 mg total) by mouth daily with breakfast.   Refills: 0     FLUoxetine 20 MG Caps  Commonly known as: PROzac      Take 1 capsule (20 mg total) by mouth daily.   Quantity: 90 capsule  Refills: 0     loratadine 10 MG Tabs  Commonly known as: Claritin      Take 1 tablet (10 mg total) by mouth 2 (two) times daily.   Refills: 0     METAMUCIL 3 IN 1 DAILY FIBER OR       Refills: 0     Metanx 3-90.314-2-35 MG Caps      Take 1 tablet by mouth daily.   Stop taking on: December 25, 2024  Quantity: 90 capsule  Refills: 3     prenatal vitamin with DHA 27-0.8-228 MG Caps      Take 1 capsule by mouth daily.   Refills: 0     promethazine 25 MG Tabs  Commonly known as: Phenergan      Take 1 tablet (25 mg total) by mouth every 6 (six) hours as needed for Nausea.   Quantity: 30 tablet  Refills: 1     pyridoxine 100 MG Tabs  Commonly known as: Vitamin B6      Take 1 tablet (100 mg total) by mouth daily.   Refills: 0     Vitamin B12 100 MCG Tabs  Commonly known as: CYANOCOBALAMIN       Refills: 0            STOP taking these medications      UNISOM OR                  Where to Get Your Medications        These medications were sent to  [id number] - [place], [place] - [address] [facility] AT [facility] [address], [phone], [phone]  [address] [facility], [facility] [address]      Phone: [phone]   furosemide 20 MG Tabs  potassium chloride 20 MEQ Tbcr              Discharge Diet: As tolerated    Discharge Activity: Pelvic rest until cleared    Follow up:      Follow-up Information       Clara Hartman, DO Follow up in 6 week(s).    Specialty: OBSTETRICS & GYNECOLOGY  Why: Post Partum appointment  Contact information:  [address], [facility] [address]  [facility] [id number] [phone]               [facility]. Call.    Specialty: Pediatrics  Why: As needed  Contact information:  [address] [place] [address] [phone]  Additional information:  Your appointment is scheduled at [facility] - [address] 2nd floor [place], [place]   Reserved parking is available for the Breastfeeding Center in parking lot B off of [address]. Parking lot B is the parking lot closest to the Breastfeeding Center building.      What to Bring to your Appointment:      Referrals-   If your insurance requires a referral, you will need a referral from your OB physician for yourself and a referral from your baby’s physician for baby.      Fax referrals to the Breastfeeding Center at [phone]   Baby and All Supplies-   Adjust your feeding schedule on the day of your appointment so baby is hungry at the time of your appointment. This usually means to NOT feed for at least 2 - 3 hours prior to your appointment      Please bring ALL equipment you are using (such as a breast pump, pump parts, nipple shield, etc).      If you are supplementing your baby, bring enough expressed breastmilk or formula for a full feeding, and the method you are using for feeding your baby.      Masks are optional for all patients and visitors, unless otherwise indicated.             [facility]  [facility] - OB/GYN. Schedule an appointment as soon as possible for a visit in 4 day(s).    Specialty: Obstetrics/Gynecology  Why: Blood presure check 3-5 days after discharge from hospital  Contact information:  Eugene Zaldivar [address] [place] [id number] [phone]  Additional information:  Masks are optional for all patients and visitors, unless otherwise indicated.                               Other Discharge Instructions:         Hypertension related to pregnancy/postpartum    -Watch for symptoms of pre-eclampsia (severe or persistent headache not relieved with a dose of tylenol, visual disturbances, persistent or severe upper abdominal pain, shortness of breath, chest pain)  -Please obtain a blood pressure cuff for home from the list of US Blood Pressure Validated Device Listing- see www.validatebp.org  -Check blood pressure (and heart rate if you can) 2-3 times per day & more frequently if feeling poorly. Keep log & bring to visits.      If BP is 140/90 or higher (either number) you will likely be prescribed oral antihypertensive medication. Check blood pressure before a dose of medication. Can hold the medicine if you feel your blood pressure is getting too low (less than 110/70) or you are lightheaded.      If BP is 160/110 (either number) or higher, or you develop symptoms of pre-eclampsia, please immediately go to the emergency department at [facility] & let them know you may have pre-eclampsia. You will likely require IV antihypertensives and IV magnesium sulfate to prevent stroke and seizures.       Nothing in the vagina for 6 weeks   No strenuous activity/exercise  May shower immediately. May take bath  Keep wound(s) clean and dry. Wash daily with warm water & soap. Do not scrub. Gently pat dry. May cover with clean gauze or pad if needed.     AVOID CONSTIPATION:   -Take Miralax one capful in water or juice each morning.  You can also take each evening iif needed.  -Take  Fiber supplement along with Miralax as well.  -May also take milk of magnesia or Dulcolax over the counter if needing to have a BM more urgently than Miralax is providing    -Do NOT strain for bowel movements    Please call office if:  -fever 100.4 or higher    Please proceed to the Emergency Department at [facility] for any of the following:   -vaginal bleeding soaking greater than 1 pad per hour  -severe pelvic pain  -shortness of breath  -chest pain  -leg pain or swelling         Ana M Bernard MD

## 2024-12-13 NOTE — PROGRESS NOTES
Pt up to BR with assistance from this RN. Gait steady. Pt voids without difficulty. Chloé-bottle given. Pt denies need for tucks and dermoplast to perineum. Pt up to WC without incident.

## 2024-12-13 NOTE — PROGRESS NOTES
OB progress note    29 year old  female PPD#1 s/p  viable male at 39w4d on 24 after IOL for intermittent decelerations (noted on doppler & then on NST). Pregnancy complicated by GERD, depression & anxiety on fluoxetine, iron deficiency, overweight, excessive weight gain 53 lb. True knot in umbilical cord. Mild postpartum hemorrhage  mL.     BP intermittently elevated during labor course  -admission Plt, Cr, LFTs normal   -BP normal to pre-hypertensive since delivery  -denies symptoms of pre-eclampsia  -discussed symptoms of pre-eclampsia, possibility BP may trend upward & recommend she stay at least 1 more night so BP can be watched.     Postpartum hemorrhage  -admission Hb 12.0 ->  mL  -PPD#1 Hb 11.3    Anxiety & depression  -continue fluoxetine    Pain controlled  Rh+, GBS neg   Ambulation encouraged  RSV vaccine done 24   Circumcision for infant requested by patient but infant appears to have concealed/hidden penis. Recommend he be seen by pediatric urology to have the proper anchoring procedure as well as circumcision at that time.      Disposition - probably discharge home tomorrow. Does have BP cuff at home.     Subjective: Pain controlled. Lochia normal. Eating, ambulating, voiding without difficulty. Is pumping/breastfeeding. Swelling is not too bad. No HA, vision changes, epigastric pain. Very tired.     Vitals:    24 1015 24 1041 24 1931 24 0732   BP: 122/67 133/85 133/81 120/74   BP Location:  Right arm Right arm Right arm   Pulse: 66 62 81 82   Resp:  16 18 18   Temp:  98.2 °F (36.8 °C) 97.6 °F (36.4 °C) 97.7 °F (36.5 °C)   TempSrc:  Oral Oral Oral   SpO2:       Weight:       Height:         Temp:  [97.6 °F (36.4 °C)-98.2 °F (36.8 °C)] 97.7 °F (36.5 °C)  Pulse:  [60-82] 82  Resp:  [16-18] 18  BP: (120-134)/(67-85) 120/74     ibuprofen  600 mg Oral Q6H    docusate sodium  100 mg Oral BID@0600,1800    famotidine  20 mg Oral BID    FLUoxetine  20  mg Oral Daily       [unfilled]    Exam:  Gen A&O, NAD  CV RRR  Lungs CTAB  Abd soft, nontender, fundus firm under umbilicus  Ext nontender, 1+ edema    Recent Labs   Lab 12/14/24  0630   WBC 15.4*   HGB 11.3*   .0   NE 10.80*       Ana M Bernard MD

## 2024-12-13 NOTE — L&D DELIVERY NOTE
Matt Soler [[id number]]      Labor Events     labor?: No   steroids?: None  Antibiotics received during labor?: No  Rupture date/time: 2024 2314     Rupture type: AROM  Fluid color: Clear  Labor type: Induced Onset of Labor  Induction: Oxytocin, AROM  Indications for induction: Other - comment  Induction comment: Decelerations    Intrapartum & labor complications: None       Labor Event Times    Labor onset date/time: 2024 1605  Dilation complete date/time: 2024 0656  Start pushing date/time: 2024 0744       [place] Presentation    Presentation: Vertex  Position: Right Occiput Anterior       Operative Delivery    Operative Vaginal Delivery: No                Shoulder Dystocia    Shoulder Dystocia: No       Anesthesia    Method: Epidural              [place] Delivery      Head delivery date/time: 2024 07:53:09   Delivery date/time:  24 07:53:20   Delivery type: Normal spontaneous vaginal delivery    Details:     Delivery location: delivery room       Delivery Providers    Delivering Clinician: Clara Hartman DO   Delivery personnel:  Provider Role   Batsheva Kat, RN Baby Nurse   Nyla Tellez RN Delivery Nurse             Cord    Vessels: 3 Vessels  Complications: Knot  Timed cord clamping: Yes  Time in sec: 30  Cord blood disposition: to lab  Gases sent?: No       Resuscitation    Method: None       [place] Measurements      Weight: 3720 g 8 lb 3.2 oz Length: 50.8 cm     Head circum.: 34.5 cm Chest circum.: 33 cm      Abdominal circum.: 33 cm           Placenta    Date/time: 2024 0756  Removal: Expressed  Appearance: Intact  Disposition: Lab       Apgars    Living status: Living   Apgar Scoring Key:    0 1 2    Skin color Blue or pale Acrocyanotic Completely pink    Heart rate Absent <100 bpm >100 bpm    Reflex irritability No response Grimace Cry or active withdrawal    Muscle tone Limp Some flexion Active motion    Respiratory effort Absent  Weak cry; hypoventilation Good, crying              1 Minute:  5 Minute:  10 Minute:  15 Minute:  20 Minute:      Skin color: 0  1       Heart rate: 2  2       Reflex irritablity: 2  2       Muscle tone: 2  2       Respiratory effort: 2  2       Total: 8  9          Apgars assigned by: SHERRY SIEGEL RN  [place] disposition: with mother       Skin to Skin    No data filed       Vaginal Count    Initial count RN: Zenaida Martinez RN  Initial count Tech: Maddi Felix    Initial counts 11   0    Final counts 11   1    Final count RN: Nyla Tellez RN  Final count MD: Clara Hartman DO       Lacerations    Episiotomy: None  Perineal lacerations: 2nd Repaired?: Yes     Vaginal laceration?: No      Cervical laceration?: No    Clitoral laceration?: No                29 y.o.  at 39w4d with  male infant. APGARs 8/9, weight 8lb3oz. Body and shoulders easily delivered. Bulb suctioned. Umbilical cord clamped x2 and cut after 30 seconds. Placenta delivered spontaneously , intact. True knot noted on umbilical cord. 2nd degree perineal laceration repaired with 2-0 chromic. Good hemostasis.  ml

## 2024-12-13 NOTE — PROGRESS NOTES
Labor Progress note    Subjective:     Ready for AROM  Pitocin of 8    Objective:  Vitals:    24 1800 24 1930 24 2030 24 2321   BP: 124/61 116/60 120/62    BP Location:  Right arm     Pulse: 60 58 60    Resp:  16     Temp:  98.1 °F (36.7 °C)  97.7 °F (36.5 °C)   TempSrc:  Axillary  Oral   Weight:       Height:         Temp:  [97.7 °F (36.5 °C)-98.2 °F (36.8 °C)] 97.7 °F (36.5 °C)  Pulse:  [58-91] 60  Resp:  [16-18] 16  BP: (114-133)/(54-78) 120/62    Urine Output        Date/Time Urine Intermittent/Straight Cath (mL) Voided Urine (mL)    24 1805 1100  --  --                    Physical Exam  Gen A&O, NAD  CV regular rate  Lungs no increased WOB  Abd soft in between contractions  Ext nontender  SVE: 1 --> 3 cm/75/-2 AROM clear     CEFM: 125/moderate/+accels/no decels reactive    Recent Labs   Lab 24  1518   RBC 4.25   HGB 12.0   HCT 36.1   MCV 84.9   MCH 28.2   MCHC 33.2   RDW 14.1   NEPRELIM 8.71*   WBC 11.8*   .0           Assessment and Plan: 29 year old  at 39w3d presenting for induction of labor for non-reassuring  testing on NST at term    #IOL  - 1 to 3 cm/75/-2, now AROM clear and on pitocin    #FWB  - CEFM: category 1  - Presentation: VTX    #GBS status: negative    Nara Bey MD

## 2024-12-13 NOTE — PLAN OF CARE
Problem: BIRTH - VAGINAL/ SECTION  Goal: Fetal and maternal status remain reassuring during the birth process  Description: INTERVENTIONS:  - Monitor vital signs  - Monitor fetal heart rate  - Monitor uterine activity  - Monitor labor progression (vaginal delivery)  - DVT prophylaxis (C/S delivery)  - Surgical antibiotic prophylaxis (C/S delivery)  Outcome: Completed     Problem: PAIN - ADULT  Goal: Verbalizes/displays adequate comfort level or patient's stated pain goal  Description: INTERVENTIONS:  - Encourage pt to monitor pain and request assistance  - Assess pain using appropriate pain scale  - Administer analgesics based on type and severity of pain and evaluate response  - Implement non-pharmacological measures as appropriate and evaluate response  - Consider cultural and social influences on pain and pain management  - Manage/alleviate anxiety  - Utilize distraction and/or relaxation techniques  - Monitor for opioid side effects  - Notify MD/LIP if interventions unsuccessful or patient reports new pain  - Anticipate increased pain with activity and pre-medicate as appropriate  Outcome: Completed     Problem: ANXIETY  Goal: Will report anxiety at manageable levels  Description: INTERVENTIONS:  - Administer medication as ordered  - Teach and rehearse alternative coping skills  - Provide emotional support with 1:1 interaction with staff  Outcome: Completed     Problem: Patient/Family Goals  Goal: Patient/Family Long Term Goal  Description: Patient's Long Term Goal: uncomplicated vaginal delivery    Interventions:  - See additional Care Plan goals for specific interventions  Outcome: Completed  Goal: Patient/Family Short Term Goal  Description: Patient's Short Term Goal: effective pain and anxiety management    Interventions:   - See additional Care Plan goals for specific interventions  Outcome: Completed

## 2024-12-14 NOTE — PROGRESS NOTES
Labor Analgesia Follow Up Note    Patient underwent epidural anesthesia for labor analgesia,    Placenta Date/Time: 12/13/2024  7:56 AM    Delivery Date/Time:: 12/13/2024  7:53 AM    /74 (BP Location: Right arm)   Pulse 82   Temp 97.7 °F (36.5 °C) (Oral)   Resp 18   Ht 1.651 m (5' 5\")   Wt 93.4 kg (206 lb)   LMP 03/11/2024   SpO2 99%   Breastfeeding Yes   BMI 34.28 kg/m²     Assessment:  Patient seen and no apparent anesthesia related complications.    Thank you for asking us to participate in the care of your patient.

## 2024-12-15 NOTE — PROGRESS NOTES
OB progress note    29 year old  female PPD#2 s/p  viable male at 39w4d on 24 after IOL for intermittent decelerations (noted on doppler & then on NST). Pregnancy complicated by GERD, depression & anxiety on fluoxetine, iron deficiency, overweight, excessive weight gain 53 lb. True knot in umbilical cord. Mild postpartum hemorrhage  mL.     BP intermittently elevated during labor course  -admission Plt, Cr, LFTs normal   -Symptoms of pre-eclampsia - denies    -Discussed symptoms of pre-eclampsia  -BP normal to pre-hypertensive since delivery      BLE edema & some hand edema  -will start lasix & Kdur daily PRN    Postpartum hemorrhage  -admission Hb 12.0 ->  mL  -PPD#1 Hb 11.3    Anxiety & depression  -continue fluoxetine    Pain controlled  Rh+, GBS neg   Ambulation encouraged  RSV vaccine done 24   Circumcision for infant requested by patient but infant appears to have concealed/hidden penis. Recommend he be seen by pediatric urology to have the proper anchoring procedure as well as circumcision at that time.      Disposition - discharge home today. Has BP cuff at home.     Subjective: Pain controlled. Lochia normal. Eating, ambulating, voiding without difficulty. Is pumping/breastfeeding. Swelling is in hands and legs. Would like to try some Lasix. No HA, vision changes, epigastric pain.     Vitals:    24 1601 24 2045 12/15/24 0129 12/15/24 0533   BP: 123/77 126/65 113/53 126/69   BP Location: Right arm Right arm Right arm Right arm   Pulse: 73 67 72 64   Resp: 18 18 16 18   Temp: 98 °F (36.7 °C) 97.9 °F (36.6 °C) 97.9 °F (36.6 °C) 98.3 °F (36.8 °C)   TempSrc: Oral Oral Oral Oral   SpO2:       Weight:       Height:         Temp:  [97.7 °F (36.5 °C)-98.3 °F (36.8 °C)] 98.3 °F (36.8 °C)  Pulse:  [64-82] 64  Resp:  [16-18] 18  BP: (113-126)/(53-77) 126/69     ibuprofen  600 mg Oral Q6H    docusate sodium  100 mg Oral BID@0600,1800    famotidine  20 mg Oral BID     FLUoxetine  20 mg Oral Daily       [unfilled]    Exam:  Gen A&O, NAD  CV RRR  Lungs CTAB  Abd soft, nontender, fundus firm under umbilicus  Ext nontender, 1+ to 2+ BLE edema & hand edema    Recent Labs   Lab 12/14/24  0630   WBC 15.4*   HGB 11.3*   .0   NE 10.80*       Ana M Bernard MD

## 2024-12-15 NOTE — PLAN OF CARE

## 2024-12-17 NOTE — PROGRESS NOTES
12/17/24 1413   Cradle Call Follow up   Cradle call follow up date 12/17/24   Follow up needed Completed   Hypertension or Preeclampsia during pregnancy or delivery No     Outgoing Cradle Call completed:    Mom reports that she and infant are doing well.  Mom and Baby have follow up appts scheduled.  No current complaints of PP blues or depression at this time.  Reviewed basic self and infant care.   Encouraged to follow up w/ MD's w/ further question/concerns.  Invited to [facility]; Resources sent via [employer]

## 2024-12-31 NOTE — TELEPHONE ENCOUNTER
Breast Pump [facility]  Order form was placed in Dr. Clara Hartman's bin for signature.

## 2025-01-10 NOTE — TELEPHONE ENCOUNTER
Dr. Hartman,    **The ACKNOWLEDGE button has been moved to the top right ribbon**    Please sign off on form if you agree to: Disability/maternity leave  (place your signature on the first page only)  -From your Inbasket, Highlight the patient and click Chart  -Double click the 1/9/25 Forms Completion telephone encounter  -Scroll down to the Media section  -Click the blue Hyperlink: Disability, Clara Hartman, 1/10/25    -Click Acknowledge located in the top right ribbon/menu  -Drag the mouse into the blank space of the document and a + sign will appear. Left click to  electronically sign the document.    Thank you,    Zoraida ERAZO
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
## 2025-02-15 NOTE — TELEPHONE ENCOUNTER
Last time medication was refilled: 11/22/24  Next office visit due/scheduled: no apt  Last office visit: 7/16/24  Last Labs: 7/16/24

## 2025-05-14 ENCOUNTER — APPOINTMENT (OUTPATIENT)
Dept: OBGYN | Age: 30
End: 2025-05-14

## 2025-06-12 NOTE — TELEPHONE ENCOUNTER
Last time medication was refilled 02/15/2025  Last office visit  07/16/2024  Next office visit due/scheduled No future appointments.        Medication not on protocol.
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
## 2025-07-06 SDOH — ECONOMIC STABILITY: FOOD INSECURITY: WITHIN THE PAST 12 MONTHS, THE FOOD YOU BOUGHT JUST DIDN'T LAST AND YOU DIDN'T HAVE MONEY TO GET MORE.: NEVER TRUE

## 2025-07-06 SDOH — ECONOMIC STABILITY: HOUSING INSECURITY: WHAT IS YOUR LIVING SITUATION TODAY?: I HAVE A STEADY PLACE TO LIVE

## 2025-07-06 SDOH — ECONOMIC STABILITY: TRANSPORTATION INSECURITY
IN THE PAST 12 MONTHS, HAS LACK OF RELIABLE TRANSPORTATION KEPT YOU FROM MEDICAL APPOINTMENTS, MEETINGS, WORK OR FROM GETTING THINGS NEEDED FOR DAILY LIVING?: NO

## 2025-07-06 SDOH — ECONOMIC STABILITY: HOUSING INSECURITY: DO YOU HAVE PROBLEMS WITH ANY OF THE FOLLOWING?: NONE OF THE ABOVE

## 2025-07-06 ASSESSMENT — SOCIAL DETERMINANTS OF HEALTH (SDOH): IN THE PAST 12 MONTHS, HAS THE ELECTRIC, GAS, OIL, OR WATER COMPANY THREATENED TO SHUT OFF SERVICE IN YOUR HOME?: NO

## 2025-07-07 ENCOUNTER — APPOINTMENT (OUTPATIENT)
Dept: FAMILY MEDICINE | Age: 30
End: 2025-07-07

## 2025-07-07 VITALS
TEMPERATURE: 97.5 F | RESPIRATION RATE: 18 BRPM | SYSTOLIC BLOOD PRESSURE: 116 MMHG | HEART RATE: 85 BPM | WEIGHT: 168 LBS | BODY MASS INDEX: 27.99 KG/M2 | DIASTOLIC BLOOD PRESSURE: 80 MMHG | HEIGHT: 65 IN | OXYGEN SATURATION: 98 %

## 2025-07-07 DIAGNOSIS — Z76.89 ESTABLISHING CARE WITH NEW DOCTOR, ENCOUNTER FOR: ICD-10-CM

## 2025-07-07 DIAGNOSIS — Z30.09 BIRTH CONTROL COUNSELING: ICD-10-CM

## 2025-07-07 DIAGNOSIS — Z71.3 DIETARY COUNSELING: ICD-10-CM

## 2025-07-07 DIAGNOSIS — Z00.00 ANNUAL PHYSICAL EXAM: Primary | ICD-10-CM

## 2025-07-07 DIAGNOSIS — E66.3 OVERWEIGHT (BMI 25.0-29.9): ICD-10-CM

## 2025-07-07 DIAGNOSIS — Z71.82 EXERCISE COUNSELING: ICD-10-CM

## 2025-07-07 LAB
B-HCG UR QL: NEGATIVE
INTERNAL PROCEDURAL CONTROLS ACCEPTABLE: YES
TEST LOT EXPIRATION DATE: NORMAL
TEST LOT NUMBER: NORMAL

## 2025-07-07 PROCEDURE — 99385 PREV VISIT NEW AGE 18-39: CPT | Performed by: FAMILY MEDICINE

## 2025-07-07 PROCEDURE — 81025 URINE PREGNANCY TEST: CPT | Performed by: FAMILY MEDICINE

## 2025-07-07 RX ORDER — DROSPIRENONE AND ETHINYL ESTRADIOL 0.02-3(28)
1 KIT ORAL DAILY
Qty: 28 TABLET | Refills: 11 | Status: SHIPPED | OUTPATIENT
Start: 2025-07-07

## 2025-07-07 ASSESSMENT — PATIENT HEALTH QUESTIONNAIRE - PHQ9
2. FEELING DOWN, DEPRESSED OR HOPELESS: NOT AT ALL
1. LITTLE INTEREST OR PLEASURE IN DOING THINGS: NOT AT ALL
CLINICAL INTERPRETATION OF PHQ2 SCORE: NO FURTHER SCREENING NEEDED
SUM OF ALL RESPONSES TO PHQ9 QUESTIONS 1 AND 2: 0
SUM OF ALL RESPONSES TO PHQ9 QUESTIONS 1 AND 2: 0

## (undated) DEVICE — PENCIL TELESCOPE MEGADYNE SE

## (undated) DEVICE — SOLUTION IRRIG 1000ML 0.9% NACL USP BTL

## (undated) DEVICE — PROCISE XP WAND: Brand: COBLATION

## (undated) DEVICE — T & A CDS: Brand: MEDLINE INDUSTRIES, INC.

## (undated) DEVICE — STERILE POLYISOPRENE POWDER-FREE SURGICAL GLOVES: Brand: PROTEXIS

## (undated) DEVICE — ELECTRODE ES L2.75IN XLN STD BLDE MOD E-Z CLN